# Patient Record
Sex: FEMALE | Race: WHITE | Employment: FULL TIME | URBAN - METROPOLITAN AREA
[De-identification: names, ages, dates, MRNs, and addresses within clinical notes are randomized per-mention and may not be internally consistent; named-entity substitution may affect disease eponyms.]

---

## 2018-03-02 ENCOUNTER — HOSPITAL ENCOUNTER (OUTPATIENT)
Dept: MAMMOGRAPHY | Age: 53
Discharge: HOME | End: 2018-03-02

## 2018-03-02 DIAGNOSIS — Z12.31 VISIT FOR SCREENING MAMMOGRAM: ICD-10-CM

## 2018-03-05 ENCOUNTER — OFFICE VISIT (OUTPATIENT)
Dept: FAMILY MEDICINE CLINIC | Age: 53
End: 2018-03-05

## 2018-03-05 VITALS
HEIGHT: 66 IN | RESPIRATION RATE: 18 BRPM | WEIGHT: 160.2 LBS | HEART RATE: 72 BPM | BODY MASS INDEX: 25.75 KG/M2 | OXYGEN SATURATION: 98 % | DIASTOLIC BLOOD PRESSURE: 80 MMHG | TEMPERATURE: 98.2 F | SYSTOLIC BLOOD PRESSURE: 110 MMHG

## 2018-03-05 DIAGNOSIS — N95.1 HOT FLASHES DUE TO MENOPAUSE: ICD-10-CM

## 2018-03-05 DIAGNOSIS — G89.29 CHRONIC LEFT SHOULDER PAIN: ICD-10-CM

## 2018-03-05 DIAGNOSIS — M25.512 CHRONIC LEFT SHOULDER PAIN: ICD-10-CM

## 2018-03-05 DIAGNOSIS — F41.9 ANXIETY: ICD-10-CM

## 2018-03-05 DIAGNOSIS — Z96.652 HISTORY OF LEFT KNEE REPLACEMENT: ICD-10-CM

## 2018-03-05 DIAGNOSIS — Z23 IMMUNIZATION DUE: ICD-10-CM

## 2018-03-05 DIAGNOSIS — Z00.00 PHYSICAL EXAM: Primary | ICD-10-CM

## 2018-03-05 DIAGNOSIS — Z13.29 SCREENING FOR THYROID DISORDER: ICD-10-CM

## 2018-03-05 PROCEDURE — 99386 PREV VISIT NEW AGE 40-64: CPT | Performed by: NURSE PRACTITIONER

## 2018-03-05 PROCEDURE — 36415 COLL VENOUS BLD VENIPUNCTURE: CPT | Performed by: NURSE PRACTITIONER

## 2018-03-05 RX ORDER — SERTRALINE HYDROCHLORIDE 25 MG/1
25 TABLET, FILM COATED ORAL DAILY
Qty: 30 TABLET | Refills: 1 | Status: SHIPPED | OUTPATIENT
Start: 2018-03-05 | End: 2018-04-04 | Stop reason: SDUPTHER

## 2018-04-05 RX ORDER — SERTRALINE HYDROCHLORIDE 25 MG/1
TABLET, FILM COATED ORAL
Qty: 90 TABLET | Refills: 1 | Status: SHIPPED | OUTPATIENT
Start: 2018-04-05 | End: 2018-04-24

## 2018-04-24 ENCOUNTER — OFFICE VISIT (OUTPATIENT)
Dept: FAMILY MEDICINE CLINIC | Age: 53
End: 2018-04-24

## 2018-04-24 VITALS
WEIGHT: 166 LBS | TEMPERATURE: 98.5 F | HEART RATE: 79 BPM | BODY MASS INDEX: 26.79 KG/M2 | RESPIRATION RATE: 22 BRPM | OXYGEN SATURATION: 97 % | SYSTOLIC BLOOD PRESSURE: 120 MMHG | DIASTOLIC BLOOD PRESSURE: 78 MMHG

## 2018-04-24 DIAGNOSIS — Z13.29 SCREENING FOR THYROID DISORDER: ICD-10-CM

## 2018-04-24 DIAGNOSIS — M79.674 PAIN IN RIGHT TOE(S): ICD-10-CM

## 2018-04-24 DIAGNOSIS — F41.9 ANXIETY: Primary | ICD-10-CM

## 2018-04-24 DIAGNOSIS — Z01.818 PRE-OP EVALUATION: ICD-10-CM

## 2018-04-24 PROCEDURE — 99213 OFFICE O/P EST LOW 20 MIN: CPT | Performed by: NURSE PRACTITIONER

## 2018-04-24 ASSESSMENT — PATIENT HEALTH QUESTIONNAIRE - PHQ9
SUM OF ALL RESPONSES TO PHQ QUESTIONS 1-9: 0
SUM OF ALL RESPONSES TO PHQ9 QUESTIONS 1 & 2: 0
2. FEELING DOWN, DEPRESSED OR HOPELESS: 0
1. LITTLE INTEREST OR PLEASURE IN DOING THINGS: 0

## 2018-04-25 ENCOUNTER — TELEPHONE (OUTPATIENT)
Dept: FAMILY MEDICINE CLINIC | Age: 53
End: 2018-04-25

## 2018-04-25 DIAGNOSIS — M25.512 LEFT SHOULDER PAIN, UNSPECIFIED CHRONICITY: ICD-10-CM

## 2018-04-25 DIAGNOSIS — M54.2 NECK PAIN: Primary | ICD-10-CM

## 2018-04-25 DIAGNOSIS — M25.562 LEFT KNEE PAIN, UNSPECIFIED CHRONICITY: ICD-10-CM

## 2018-05-01 ENCOUNTER — OFFICE VISIT (OUTPATIENT)
Dept: ORTHOPEDIC SURGERY | Age: 53
End: 2018-05-01

## 2018-05-01 ENCOUNTER — TELEPHONE (OUTPATIENT)
Dept: FAMILY MEDICINE CLINIC | Age: 53
End: 2018-05-01

## 2018-05-01 ENCOUNTER — TELEPHONE (OUTPATIENT)
Dept: ORTHOPEDIC SURGERY | Age: 53
End: 2018-05-01

## 2018-05-01 VITALS
SYSTOLIC BLOOD PRESSURE: 124 MMHG | BODY MASS INDEX: 26.68 KG/M2 | WEIGHT: 166.01 LBS | HEART RATE: 98 BPM | DIASTOLIC BLOOD PRESSURE: 70 MMHG | HEIGHT: 66 IN

## 2018-05-01 DIAGNOSIS — S92.502A CLOSED FRACTURE OF PHALANX OF LEFT FOURTH TOE, INITIAL ENCOUNTER: ICD-10-CM

## 2018-05-01 DIAGNOSIS — Z01.818 PRE-OP EVALUATION: Primary | ICD-10-CM

## 2018-05-01 DIAGNOSIS — M79.671 FOOT PAIN, RIGHT: Primary | ICD-10-CM

## 2018-05-01 PROCEDURE — 99203 OFFICE O/P NEW LOW 30 MIN: CPT | Performed by: PODIATRIST

## 2018-05-02 ENCOUNTER — EVALUATION (OUTPATIENT)
Dept: PHYSICAL THERAPY | Age: 53
End: 2018-05-02

## 2018-05-02 DIAGNOSIS — M54.2 NECK PAIN: Primary | ICD-10-CM

## 2018-05-04 ENCOUNTER — HOSPITAL ENCOUNTER (OUTPATIENT)
Dept: SURGERY | Age: 53
Discharge: HOME | End: 2018-05-04
Attending: PODIATRIST | Admitting: PODIATRIST

## 2018-05-04 VITALS
TEMPERATURE: 98.2 F | RESPIRATION RATE: 20 BRPM | SYSTOLIC BLOOD PRESSURE: 116 MMHG | DIASTOLIC BLOOD PRESSURE: 72 MMHG | HEART RATE: 76 BPM | HEIGHT: 66 IN | BODY MASS INDEX: 25.71 KG/M2 | WEIGHT: 160 LBS | OXYGEN SATURATION: 98 %

## 2018-05-04 DIAGNOSIS — S92.501S: Primary | ICD-10-CM

## 2018-05-04 RX ORDER — OXYCODONE HYDROCHLORIDE AND ACETAMINOPHEN 5; 325 MG/1; MG/1
1 TABLET ORAL PRN
Status: ACTIVE | OUTPATIENT
Start: 2018-05-04 | End: 2018-05-04

## 2018-05-04 RX ORDER — OXYCODONE HYDROCHLORIDE AND ACETAMINOPHEN 5; 325 MG/1; MG/1
2 TABLET ORAL PRN
Status: ACTIVE | OUTPATIENT
Start: 2018-05-04 | End: 2018-05-04

## 2018-05-04 RX ORDER — MORPHINE SULFATE 2 MG/ML
1 INJECTION, SOLUTION INTRAMUSCULAR; INTRAVENOUS EVERY 5 MIN PRN
Status: DISCONTINUED | OUTPATIENT
Start: 2018-05-04 | End: 2018-05-05 | Stop reason: HOSPADM

## 2018-05-04 RX ORDER — PROMETHAZINE HYDROCHLORIDE 25 MG/1
25 TABLET ORAL EVERY 6 HOURS PRN
Qty: 30 TABLET | Refills: 0 | Status: SHIPPED | OUTPATIENT
Start: 2018-05-04 | End: 2018-05-11

## 2018-05-04 RX ORDER — SODIUM CHLORIDE 0.9 % (FLUSH) 0.9 %
10 SYRINGE (ML) INJECTION EVERY 12 HOURS SCHEDULED
Status: DISCONTINUED | OUTPATIENT
Start: 2018-05-04 | End: 2018-05-05 | Stop reason: HOSPADM

## 2018-05-04 RX ORDER — LABETALOL HYDROCHLORIDE 5 MG/ML
5 INJECTION, SOLUTION INTRAVENOUS EVERY 10 MIN PRN
Status: DISCONTINUED | OUTPATIENT
Start: 2018-05-04 | End: 2018-05-05 | Stop reason: HOSPADM

## 2018-05-04 RX ORDER — PROMETHAZINE HYDROCHLORIDE 25 MG/ML
6.25 INJECTION, SOLUTION INTRAMUSCULAR; INTRAVENOUS
Status: ACTIVE | OUTPATIENT
Start: 2018-05-04 | End: 2018-05-04

## 2018-05-04 RX ORDER — ONDANSETRON 2 MG/ML
4 INJECTION INTRAMUSCULAR; INTRAVENOUS
Status: ACTIVE | OUTPATIENT
Start: 2018-05-04 | End: 2018-05-04

## 2018-05-04 RX ORDER — SODIUM CHLORIDE 0.9 % (FLUSH) 0.9 %
10 SYRINGE (ML) INJECTION PRN
Status: DISCONTINUED | OUTPATIENT
Start: 2018-05-04 | End: 2018-05-05 | Stop reason: HOSPADM

## 2018-05-04 RX ORDER — MEPERIDINE HYDROCHLORIDE 50 MG/ML
12.5 INJECTION INTRAMUSCULAR; INTRAVENOUS; SUBCUTANEOUS EVERY 5 MIN PRN
Status: DISCONTINUED | OUTPATIENT
Start: 2018-05-04 | End: 2018-05-05 | Stop reason: HOSPADM

## 2018-05-04 RX ORDER — DIPHENHYDRAMINE HYDROCHLORIDE 50 MG/ML
12.5 INJECTION INTRAMUSCULAR; INTRAVENOUS
Status: ACTIVE | OUTPATIENT
Start: 2018-05-04 | End: 2018-05-04

## 2018-05-04 RX ORDER — HYDRALAZINE HYDROCHLORIDE 20 MG/ML
5 INJECTION INTRAMUSCULAR; INTRAVENOUS EVERY 10 MIN PRN
Status: DISCONTINUED | OUTPATIENT
Start: 2018-05-04 | End: 2018-05-05 | Stop reason: HOSPADM

## 2018-05-04 RX ORDER — LIDOCAINE HYDROCHLORIDE 10 MG/ML
1 INJECTION, SOLUTION EPIDURAL; INFILTRATION; INTRACAUDAL; PERINEURAL
Status: ACTIVE | OUTPATIENT
Start: 2018-05-04 | End: 2018-05-04

## 2018-05-04 RX ORDER — SODIUM CHLORIDE, SODIUM LACTATE, POTASSIUM CHLORIDE, CALCIUM CHLORIDE 600; 310; 30; 20 MG/100ML; MG/100ML; MG/100ML; MG/100ML
INJECTION, SOLUTION INTRAVENOUS CONTINUOUS
Status: DISCONTINUED | OUTPATIENT
Start: 2018-05-04 | End: 2018-05-05 | Stop reason: HOSPADM

## 2018-05-04 RX ORDER — TRAMADOL HYDROCHLORIDE 50 MG/1
50 TABLET ORAL EVERY 6 HOURS PRN
Qty: 20 TABLET | Refills: 0 | Status: SHIPPED | OUTPATIENT
Start: 2018-05-04 | End: 2018-05-08

## 2018-05-04 RX ORDER — MORPHINE SULFATE 2 MG/ML
2 INJECTION, SOLUTION INTRAMUSCULAR; INTRAVENOUS EVERY 5 MIN PRN
Status: DISCONTINUED | OUTPATIENT
Start: 2018-05-04 | End: 2018-05-05 | Stop reason: HOSPADM

## 2018-05-04 RX ADMIN — SODIUM CHLORIDE, SODIUM LACTATE, POTASSIUM CHLORIDE, CALCIUM CHLORIDE: 600; 310; 30; 20 INJECTION, SOLUTION INTRAVENOUS at 06:50

## 2018-05-04 ASSESSMENT — PAIN - FUNCTIONAL ASSESSMENT: PAIN_FUNCTIONAL_ASSESSMENT: 0-10

## 2018-05-04 ASSESSMENT — PAIN DESCRIPTION - DESCRIPTORS: DESCRIPTORS: CONSTANT;ACHING;NUMBNESS

## 2018-05-04 ASSESSMENT — PAIN SCALES - GENERAL
PAINLEVEL_OUTOF10: 0

## 2018-05-08 ENCOUNTER — OFFICE VISIT (OUTPATIENT)
Dept: ORTHOPEDIC SURGERY | Age: 53
End: 2018-05-08

## 2018-05-08 VITALS
HEIGHT: 66 IN | DIASTOLIC BLOOD PRESSURE: 78 MMHG | BODY MASS INDEX: 25.72 KG/M2 | HEART RATE: 86 BPM | WEIGHT: 160.05 LBS | SYSTOLIC BLOOD PRESSURE: 128 MMHG

## 2018-05-08 DIAGNOSIS — Z09 POSTOP CHECK: ICD-10-CM

## 2018-05-08 DIAGNOSIS — S92.502S: Primary | ICD-10-CM

## 2018-05-08 PROCEDURE — 99024 POSTOP FOLLOW-UP VISIT: CPT | Performed by: PODIATRIST

## 2018-05-09 ENCOUNTER — EVALUATION (OUTPATIENT)
Dept: PHYSICAL THERAPY | Age: 53
End: 2018-05-09

## 2018-05-09 DIAGNOSIS — R53.1 DECREASED STRENGTH: ICD-10-CM

## 2018-05-09 DIAGNOSIS — M54.2 NECK PAIN: Primary | ICD-10-CM

## 2018-05-09 DIAGNOSIS — M25.60 DECREASED RANGE OF MOTION: ICD-10-CM

## 2018-05-09 PROCEDURE — 97110 THERAPEUTIC EXERCISES: CPT | Performed by: PHYSICAL THERAPIST

## 2018-05-09 PROCEDURE — 97161 PT EVAL LOW COMPLEX 20 MIN: CPT | Performed by: PHYSICAL THERAPIST

## 2018-05-09 PROCEDURE — 97530 THERAPEUTIC ACTIVITIES: CPT | Performed by: PHYSICAL THERAPIST

## 2018-05-14 ENCOUNTER — TREATMENT (OUTPATIENT)
Dept: PHYSICAL THERAPY | Age: 53
End: 2018-05-14

## 2018-05-14 DIAGNOSIS — M25.60 DECREASED RANGE OF MOTION: ICD-10-CM

## 2018-05-14 DIAGNOSIS — R53.1 DECREASED STRENGTH: ICD-10-CM

## 2018-05-14 DIAGNOSIS — M54.2 NECK PAIN: Primary | ICD-10-CM

## 2018-05-14 PROCEDURE — 97140 MANUAL THERAPY 1/> REGIONS: CPT | Performed by: PHYSICAL THERAPIST

## 2018-05-14 PROCEDURE — 97530 THERAPEUTIC ACTIVITIES: CPT | Performed by: PHYSICAL THERAPIST

## 2018-05-14 PROCEDURE — 97110 THERAPEUTIC EXERCISES: CPT | Performed by: PHYSICAL THERAPIST

## 2018-05-16 ENCOUNTER — TREATMENT (OUTPATIENT)
Dept: PHYSICAL THERAPY | Age: 53
End: 2018-05-16

## 2018-05-16 ENCOUNTER — OFFICE VISIT (OUTPATIENT)
Dept: ORTHOPEDIC SURGERY | Age: 53
End: 2018-05-16

## 2018-05-16 VITALS
WEIGHT: 160.05 LBS | BODY MASS INDEX: 25.72 KG/M2 | HEIGHT: 66 IN | HEART RATE: 64 BPM | SYSTOLIC BLOOD PRESSURE: 119 MMHG | DIASTOLIC BLOOD PRESSURE: 73 MMHG

## 2018-05-16 DIAGNOSIS — Z09 POSTOP CHECK: Primary | ICD-10-CM

## 2018-05-16 DIAGNOSIS — M25.60 DECREASED RANGE OF MOTION: ICD-10-CM

## 2018-05-16 DIAGNOSIS — S92.502S: ICD-10-CM

## 2018-05-16 DIAGNOSIS — R53.1 DECREASED STRENGTH: ICD-10-CM

## 2018-05-16 DIAGNOSIS — M54.2 NECK PAIN: Primary | ICD-10-CM

## 2018-05-16 PROCEDURE — 97110 THERAPEUTIC EXERCISES: CPT | Performed by: PHYSICAL THERAPIST

## 2018-05-16 PROCEDURE — 97140 MANUAL THERAPY 1/> REGIONS: CPT | Performed by: PHYSICAL THERAPIST

## 2018-05-16 PROCEDURE — 97530 THERAPEUTIC ACTIVITIES: CPT | Performed by: PHYSICAL THERAPIST

## 2018-05-16 PROCEDURE — 99024 POSTOP FOLLOW-UP VISIT: CPT | Performed by: PODIATRIST

## 2018-05-21 ENCOUNTER — TREATMENT (OUTPATIENT)
Dept: PHYSICAL THERAPY | Age: 53
End: 2018-05-21

## 2018-05-21 DIAGNOSIS — M54.2 NECK PAIN: Primary | ICD-10-CM

## 2018-05-21 DIAGNOSIS — M25.60 DECREASED RANGE OF MOTION: ICD-10-CM

## 2018-05-21 DIAGNOSIS — R53.1 DECREASED STRENGTH: ICD-10-CM

## 2018-05-21 LAB
A/G RATIO: 1.8 (ref 1.1–2.2)
ALBUMIN SERPL-MCNC: 4.5 G/DL (ref 3.4–5)
ALP BLD-CCNC: 54 U/L (ref 40–129)
ALT SERPL-CCNC: 65 U/L (ref 10–40)
ANION GAP SERPL CALCULATED.3IONS-SCNC: 14 MMOL/L (ref 3–16)
AST SERPL-CCNC: 63 U/L (ref 15–37)
BASOPHILS ABSOLUTE: 0 K/UL (ref 0–0.2)
BASOPHILS RELATIVE PERCENT: 1.2 %
BILIRUB SERPL-MCNC: 0.3 MG/DL (ref 0–1)
BUN BLDV-MCNC: 13 MG/DL (ref 7–20)
CALCIUM SERPL-MCNC: 8.8 MG/DL (ref 8.3–10.6)
CHLORIDE BLD-SCNC: 101 MMOL/L (ref 99–110)
CHOLESTEROL, TOTAL: 213 MG/DL (ref 0–199)
CO2: 27 MMOL/L (ref 21–32)
CREAT SERPL-MCNC: <0.5 MG/DL (ref 0.6–1.1)
EOSINOPHILS ABSOLUTE: 0.2 K/UL (ref 0–0.6)
EOSINOPHILS RELATIVE PERCENT: 4.9 %
GFR AFRICAN AMERICAN: >60
GFR NON-AFRICAN AMERICAN: >60
GLOBULIN: 2.5 G/DL
GLUCOSE BLD-MCNC: 85 MG/DL (ref 70–99)
HCT VFR BLD CALC: 40.2 % (ref 36–48)
HDLC SERPL-MCNC: 86 MG/DL (ref 40–60)
HEMOGLOBIN: 14 G/DL (ref 12–16)
LDL CHOLESTEROL CALCULATED: 96 MG/DL
LYMPHOCYTES ABSOLUTE: 1.7 K/UL (ref 1–5.1)
LYMPHOCYTES RELATIVE PERCENT: 45.5 %
MCH RBC QN AUTO: 34.2 PG (ref 26–34)
MCHC RBC AUTO-ENTMCNC: 34.9 G/DL (ref 31–36)
MCV RBC AUTO: 98.1 FL (ref 80–100)
MONOCYTES ABSOLUTE: 0.3 K/UL (ref 0–1.3)
MONOCYTES RELATIVE PERCENT: 7.9 %
NEUTROPHILS ABSOLUTE: 1.5 K/UL (ref 1.7–7.7)
NEUTROPHILS RELATIVE PERCENT: 40.5 %
PDW BLD-RTO: 13.2 % (ref 12.4–15.4)
PLATELET # BLD: 189 K/UL (ref 135–450)
PMV BLD AUTO: 8.7 FL (ref 5–10.5)
POTASSIUM SERPL-SCNC: 4.4 MMOL/L (ref 3.5–5.1)
RBC # BLD: 4.1 M/UL (ref 4–5.2)
SODIUM BLD-SCNC: 142 MMOL/L (ref 136–145)
TOTAL PROTEIN: 7 G/DL (ref 6.4–8.2)
TRIGL SERPL-MCNC: 154 MG/DL (ref 0–150)
TSH REFLEX: 3.8 UIU/ML (ref 0.27–4.2)
VLDLC SERPL CALC-MCNC: 31 MG/DL
WBC # BLD: 3.8 K/UL (ref 4–11)

## 2018-05-21 PROCEDURE — 97530 THERAPEUTIC ACTIVITIES: CPT | Performed by: PHYSICAL THERAPIST

## 2018-05-21 PROCEDURE — 97110 THERAPEUTIC EXERCISES: CPT | Performed by: PHYSICAL THERAPIST

## 2018-05-21 PROCEDURE — 97140 MANUAL THERAPY 1/> REGIONS: CPT | Performed by: PHYSICAL THERAPIST

## 2018-05-22 LAB
ESTIMATED AVERAGE GLUCOSE: 114 MG/DL
HBA1C MFR BLD: 5.6 %

## 2018-05-23 ENCOUNTER — TREATMENT (OUTPATIENT)
Dept: PHYSICAL THERAPY | Age: 53
End: 2018-05-23

## 2018-05-23 DIAGNOSIS — M54.2 NECK PAIN: Primary | ICD-10-CM

## 2018-05-23 DIAGNOSIS — R53.1 DECREASED STRENGTH: ICD-10-CM

## 2018-05-23 DIAGNOSIS — M25.60 DECREASED RANGE OF MOTION: ICD-10-CM

## 2018-05-23 PROCEDURE — 97530 THERAPEUTIC ACTIVITIES: CPT | Performed by: PHYSICAL THERAPIST

## 2018-05-23 PROCEDURE — 97110 THERAPEUTIC EXERCISES: CPT | Performed by: PHYSICAL THERAPIST

## 2018-05-23 PROCEDURE — 97140 MANUAL THERAPY 1/> REGIONS: CPT | Performed by: PHYSICAL THERAPIST

## 2018-05-31 ENCOUNTER — OFFICE VISIT (OUTPATIENT)
Dept: ORTHOPEDIC SURGERY | Age: 53
End: 2018-05-31

## 2018-05-31 VITALS
WEIGHT: 160.05 LBS | SYSTOLIC BLOOD PRESSURE: 117 MMHG | HEIGHT: 66 IN | HEART RATE: 75 BPM | DIASTOLIC BLOOD PRESSURE: 79 MMHG | BODY MASS INDEX: 25.72 KG/M2

## 2018-05-31 DIAGNOSIS — Z09 POSTOP CHECK: ICD-10-CM

## 2018-05-31 DIAGNOSIS — S92.502S: Primary | ICD-10-CM

## 2018-05-31 PROCEDURE — 99024 POSTOP FOLLOW-UP VISIT: CPT | Performed by: PODIATRIST

## 2018-06-01 ENCOUNTER — TREATMENT (OUTPATIENT)
Dept: PHYSICAL THERAPY | Age: 53
End: 2018-06-01

## 2018-06-01 DIAGNOSIS — M25.60 DECREASED RANGE OF MOTION: ICD-10-CM

## 2018-06-01 DIAGNOSIS — M54.2 NECK PAIN: Primary | ICD-10-CM

## 2018-06-01 DIAGNOSIS — R53.1 DECREASED STRENGTH: ICD-10-CM

## 2018-06-01 PROCEDURE — 97110 THERAPEUTIC EXERCISES: CPT | Performed by: PHYSICAL THERAPIST

## 2018-06-01 PROCEDURE — 97140 MANUAL THERAPY 1/> REGIONS: CPT | Performed by: PHYSICAL THERAPIST

## 2018-06-01 PROCEDURE — 97530 THERAPEUTIC ACTIVITIES: CPT | Performed by: PHYSICAL THERAPIST

## 2018-06-01 PROCEDURE — 97112 NEUROMUSCULAR REEDUCATION: CPT | Performed by: PHYSICAL THERAPIST

## 2018-06-05 ENCOUNTER — TELEPHONE (OUTPATIENT)
Dept: ORTHOPEDIC SURGERY | Age: 53
End: 2018-06-05

## 2018-06-07 PROBLEM — Z09 POSTOP CHECK: Status: RESOLVED | Noted: 2018-05-08 | Resolved: 2018-06-07

## 2018-06-18 ENCOUNTER — OFFICE VISIT (OUTPATIENT)
Dept: GYNECOLOGY | Age: 53
End: 2018-06-18

## 2018-06-18 VITALS
DIASTOLIC BLOOD PRESSURE: 75 MMHG | HEART RATE: 65 BPM | BODY MASS INDEX: 27.42 KG/M2 | SYSTOLIC BLOOD PRESSURE: 110 MMHG | TEMPERATURE: 97.2 F | HEIGHT: 65 IN | RESPIRATION RATE: 17 BRPM | WEIGHT: 164.6 LBS

## 2018-06-18 DIAGNOSIS — Z01.419 WELL WOMAN EXAM WITH ROUTINE GYNECOLOGICAL EXAM: Primary | ICD-10-CM

## 2018-06-18 DIAGNOSIS — S99.921S INJURY OF TOE ON RIGHT FOOT, SEQUELA: ICD-10-CM

## 2018-06-18 DIAGNOSIS — N39.41 URGE INCONTINENCE: ICD-10-CM

## 2018-06-18 PROCEDURE — 99386 PREV VISIT NEW AGE 40-64: CPT | Performed by: OBSTETRICS & GYNECOLOGY

## 2018-06-20 ENCOUNTER — TREATMENT (OUTPATIENT)
Dept: PHYSICAL THERAPY | Age: 53
End: 2018-06-20

## 2018-06-20 DIAGNOSIS — R53.1 DECREASED STRENGTH: ICD-10-CM

## 2018-06-20 DIAGNOSIS — M25.60 DECREASED RANGE OF MOTION: ICD-10-CM

## 2018-06-20 DIAGNOSIS — M54.2 NECK PAIN: Primary | ICD-10-CM

## 2018-06-20 LAB
HPV COMMENT: NORMAL
HPV TYPE 16: NOT DETECTED
HPV TYPE 18: NOT DETECTED
HPVOH (OTHER TYPES): NOT DETECTED

## 2018-06-20 ASSESSMENT — ENCOUNTER SYMPTOMS
GASTROINTESTINAL NEGATIVE: 1
EYES NEGATIVE: 1
RESPIRATORY NEGATIVE: 1

## 2018-06-26 ENCOUNTER — OFFICE VISIT (OUTPATIENT)
Dept: ORTHOPEDIC SURGERY | Age: 53
End: 2018-06-26

## 2018-06-26 VITALS
DIASTOLIC BLOOD PRESSURE: 78 MMHG | WEIGHT: 160 LBS | SYSTOLIC BLOOD PRESSURE: 132 MMHG | BODY MASS INDEX: 26.66 KG/M2 | HEART RATE: 88 BPM | HEIGHT: 65 IN

## 2018-06-26 DIAGNOSIS — S92.502S: Primary | ICD-10-CM

## 2018-06-26 PROCEDURE — 99024 POSTOP FOLLOW-UP VISIT: CPT | Performed by: PODIATRIST

## 2018-06-26 RX ORDER — PREDNISONE 10 MG/1
TABLET ORAL
Qty: 26 TABLET | Refills: 0 | Status: SHIPPED | OUTPATIENT
Start: 2018-06-26 | End: 2018-08-27

## 2018-07-01 ENCOUNTER — HOSPITAL ENCOUNTER (OUTPATIENT)
Dept: PHYSICAL THERAPY | Age: 53
Discharge: HOME | End: 2018-07-01
Attending: ORTHOPAEDIC SURGERY | Admitting: ORTHOPAEDIC SURGERY

## 2018-07-10 ENCOUNTER — OFFICE VISIT (OUTPATIENT)
Dept: ORTHOPEDIC SURGERY | Age: 53
End: 2018-07-10

## 2018-07-10 VITALS
BODY MASS INDEX: 24.25 KG/M2 | HEIGHT: 68 IN | HEART RATE: 87 BPM | DIASTOLIC BLOOD PRESSURE: 78 MMHG | WEIGHT: 160 LBS | SYSTOLIC BLOOD PRESSURE: 132 MMHG

## 2018-07-10 DIAGNOSIS — S92.502S: ICD-10-CM

## 2018-07-10 DIAGNOSIS — M79.671 RIGHT FOOT PAIN: Primary | ICD-10-CM

## 2018-07-10 PROCEDURE — 99024 POSTOP FOLLOW-UP VISIT: CPT | Performed by: PODIATRIST

## 2018-07-10 NOTE — PROGRESS NOTES
10 week postop check. She states that she does not feel any better. Most of her pain is at the ball of the foot. She has palpable tenderness at the plantar aspect of the right 4th MTP. No signs of infection are present. She continues having moderate edema to the toe. Status post 4th digit PIPJ arthroplasty right foot ×10 weeks    I think her activity level probably remains too high. She presents today and regular shoe and she has been removing the postop shoe at times. I think she needs to wear that full-time still and continue with the Coban on the toe. We'll consider a cortisone injection for the MTP if she continues having pain. She states that the original pain in the toe has resolved but she is having pain all around the toe now.

## 2018-08-03 ENCOUNTER — OFFICE VISIT (OUTPATIENT)
Dept: ORTHOPEDIC SURGERY | Age: 53
End: 2018-08-03

## 2018-08-03 VITALS — BODY MASS INDEX: 25.83 KG/M2 | WEIGHT: 155 LBS | HEIGHT: 65 IN

## 2018-08-03 DIAGNOSIS — S92.502A CLOSED FRACTURE OF PHALANX OF LEFT FOURTH TOE, INITIAL ENCOUNTER: Primary | ICD-10-CM

## 2018-08-03 PROCEDURE — 99203 OFFICE O/P NEW LOW 30 MIN: CPT | Performed by: ORTHOPAEDIC SURGERY

## 2018-08-03 RX ORDER — MELOXICAM 7.5 MG/1
7.5 TABLET ORAL DAILY
Qty: 30 TABLET | Refills: 0 | Status: SHIPPED | OUTPATIENT
Start: 2018-08-03 | End: 2018-09-05

## 2018-08-04 NOTE — PROGRESS NOTES
CHIEF COMPLAINT:   1- Right foot pain/ 4th toe proximal phalanx head markedly displaced comminuted fracture, Feb 2018. 2- Right foot 4th toe PIP excision arthroplasty, Dr Robina Damon 5/4/2018. HISTORY:  Ms. Mukund Lowe 52 y.o.   female  presents today for the first visit for evaluation of a right foot continues pain after injury which occurred when she stubed her foot during her honeymoon in Feb 2018. She did not seek medical attention until she saw Dr Robina Damon 5/1/2018. She then underwent excision arthroplasty right 4th toe PIP joint. She is still complaining of right forefoot foot and 4th toe pain and swelling. This is better with elevation and worse with bearing any wt. The pain is sharp and radiating up her foot and leg. No other complaint. No numbness or tingling sensation. Past Medical History:   Diagnosis Date    Chronic neck pain     Closed fracture of right foot 05/01/2018    PONV (postoperative nausea and vomiting)     Urinary incontinence        Past Surgical History:   Procedure Laterality Date    CATARACT REMOVAL Bilateral     CHOLECYSTECTOMY      FOOT SURGERY Right 05/04/2018    rt 4th digit arthroplasty of rt foot    JOINT REPLACEMENT Left     knee    LUNG SURGERY Left     lower lobe    SPINE SURGERY         Social History     Social History    Marital status:      Spouse name: N/A    Number of children: N/A    Years of education: N/A     Occupational History    Not on file.      Social History Main Topics    Smoking status: Never Smoker    Smokeless tobacco: Never Used    Alcohol use 0.6 - 1.2 oz/week     1 - 2 Glasses of wine per week    Drug use: No    Sexual activity: Yes     Birth control/ protection: Post-menopausal     Other Topics Concern    Not on file     Social History Narrative    No narrative on file       Family History   Problem Relation Age of Onset    Cancer Father         kidney    Stroke Maternal Grandmother     Heart Attack Maternal Grandfather    

## 2018-08-27 ENCOUNTER — OFFICE VISIT (OUTPATIENT)
Dept: FAMILY MEDICINE CLINIC | Age: 53
End: 2018-08-27

## 2018-08-27 VITALS
DIASTOLIC BLOOD PRESSURE: 80 MMHG | WEIGHT: 170.2 LBS | HEART RATE: 71 BPM | HEIGHT: 65 IN | TEMPERATURE: 98.6 F | BODY MASS INDEX: 28.36 KG/M2 | SYSTOLIC BLOOD PRESSURE: 110 MMHG | OXYGEN SATURATION: 97 %

## 2018-08-27 DIAGNOSIS — N39.45 CONTINUOUS LEAKAGE OF URINE: ICD-10-CM

## 2018-08-27 DIAGNOSIS — Z01.818 PRE-OP EXAMINATION: Primary | ICD-10-CM

## 2018-08-27 PROCEDURE — 99214 OFFICE O/P EST MOD 30 MIN: CPT | Performed by: FAMILY MEDICINE

## 2018-08-27 ASSESSMENT — ENCOUNTER SYMPTOMS
BLOOD IN STOOL: 0
NAUSEA: 0
SHORTNESS OF BREATH: 0
DIARRHEA: 0
CONSTIPATION: 0
VOMITING: 0
ABDOMINAL PAIN: 0
BLURRED VISION: 0

## 2018-08-27 NOTE — PATIENT INSTRUCTIONS
Okay for your procedure   By Adan Cushing, MD, who plans on performing INTERSTIM STAGE I WITH C-ARM on September 6, 2018 at [facility].    Let me know if any questions. We will fax over the information to her/the hospital.    Nice to meet you today, and I hope the interstim is successful for you. Peri Balderas.  8/27/2018    Patient Education        Learning About Your Urinary System  What does your urinary system do? The urinary system is the network of organs and tubes that process and carry urine out of the body. The kidneys and ureters are called the upper urinary tract. The bladder and urethra are called the lower urinary tract. Your kidneys filter waste products and water from the blood to make urine. They also keep the proper balance of fluids and chemicals your body needs to work as it should. Tubes called ureters carry urine from the kidneys to the bladder. The bladder stores the urine. When the bladder is full, the urine leaves the body through another thin tube, called the urethra. What problems can happen with this system? Urinary problems include:  · A urinary tract infection, or UTI. This is an infection anywhere between the kidneys and the urethra. · Kidney stones. These are tiny stones that form in the kidneys. They can cause blood in the urine and severe pain. · Trouble urinating or not being able to urinate. This is often caused by an enlarged prostate in men. · Incontinence. This means you have trouble controlling the release of urine. Common symptoms of a urinary problem include:  · A burning feeling when you urinate. This is the most common symptom of a UTI. · An urgent need to urinate. · Blood in the urine. Your urine may look red, brown, or pink. · Incontinence. Treatment for urinary problems depends on the cause. Your doctor may treat an infection with an antibiotic. Or you may need a procedure to help a kidney stone pass.   How can you

## 2018-08-27 NOTE — PROGRESS NOTES
toe of left foot    Continuous leakage of urine       Past Medical History:   Diagnosis Date    Chronic neck pain     Closed fracture of right foot 05/01/2018    PONV (postoperative nausea and vomiting)     Urinary incontinence      Past Surgical History:   Procedure Laterality Date    CATARACT REMOVAL Bilateral     CHOLECYSTECTOMY      FOOT SURGERY Right 05/04/2018    rt 4th digit arthroplasty of rt foot    JOINT REPLACEMENT Left     knee    LUNG SURGERY Left     lower lobe    SPINE SURGERY       Family History   Problem Relation Age of Onset    Cancer Father         kidney    Stroke Maternal Grandmother     Heart Attack Maternal Grandfather     Alcohol Abuse Paternal Grandfather      Social History     Social History    Marital status:      Spouse name: N/A    Number of children: N/A    Years of education: N/A     Occupational History    Not on file. Social History Main Topics    Smoking status: Never Smoker    Smokeless tobacco: Never Used    Alcohol use 0.6 - 1.2 oz/week     1 - 2 Glasses of wine per week    Drug use: No    Sexual activity: Yes     Birth control/ protection: Post-menopausal     Other Topics Concern    Not on file     Social History Narrative    No narrative on file       Review of Systems  Review of Systems   Constitutional: Negative for chills and fever. HENT: Negative for congestion and hearing loss. Eyes: Negative for blurred vision. Respiratory: Negative for shortness of breath. Cardiovascular: Negative for chest pain. Gastrointestinal: Negative for abdominal pain, blood in stool, constipation, diarrhea, nausea and vomiting. Genitourinary: Negative for dysuria and hematuria.        +continual urine leakage incontinence   Musculoskeletal: Positive for neck pain. Skin: Negative for rash. Neurological: Negative for headaches. Endo/Heme/Allergies: Does not bruise/bleed easily.    Psychiatric/Behavioral: Negative for depression and memory

## 2018-09-05 ENCOUNTER — PAT TELEPHONE (OUTPATIENT)
Dept: PREADMISSION TESTING | Age: 53
End: 2018-09-05

## 2018-09-05 VITALS — BODY MASS INDEX: 28.32 KG/M2 | HEIGHT: 65 IN | WEIGHT: 170 LBS

## 2018-09-05 NOTE — PRE-PROCEDURE INSTRUCTIONS
you.    For your comfort, please wear simple loose fitting clothing to the hospital.  Please do not bring valuables. Do not wear any make-up or nail polish on your fingers or toes      For your safety, please do not wear any jewelry or body piercing's on the day of surgery. All jewelry must be removed. If you have dentures, they will be removed before going to operating room. For your convenience, we will provide you with a container. If you wear contact lenses or glasses, they will be removed, please bring a case for them. If you have a living will and a durable power of  for healthcare, please bring in a copy. As part of our patient safety program to minimize surgical site infections, we ask you to do the following:    · Please notify your surgeon if you develop any illness between         now and the  day of your surgery. · This includes a cough, cold, fever, sore throat, nausea,         or vomiting, and diarrhea, etc.  ·  Please notify your surgeon if you experience dizziness, shortness         of breath or blurred vision between now and the time of your surgery. Do not shave your operative site 96 hours prior to surgery. For face and neck surgery, men may use an electric razor 48 hours   prior to surgery. You may shower the night before surgery or the morning of   your surgery with an antibacterial soap. You will need to bring a photo ID and insurance card    [facility] has an onsite pharmacy, would you like to utilize our pharmacy     If you will be staying overnight and use a C-pap machine, please bring   your C-pap to hospital     Our goal is to provide you with excellent care, therefore, visitors will be limited to two(2) in the room at a time so that we may focus on providing this care for you. Please contact pre-admission testing if you have any further questions.                  [facility] phone number:  [address] PAT fax number:

## 2018-09-06 ENCOUNTER — HOSPITAL ENCOUNTER (OUTPATIENT)
Dept: SURGERY | Age: 53
Discharge: HOME | End: 2018-09-06
Attending: UROLOGY | Admitting: UROLOGY

## 2018-09-06 VITALS
BODY MASS INDEX: 27.62 KG/M2 | HEART RATE: 50 BPM | WEIGHT: 165.79 LBS | HEIGHT: 65 IN | DIASTOLIC BLOOD PRESSURE: 64 MMHG | SYSTOLIC BLOOD PRESSURE: 122 MMHG | TEMPERATURE: 97.3 F | RESPIRATION RATE: 16 BRPM | OXYGEN SATURATION: 97 %

## 2018-09-06 DIAGNOSIS — N39.45 CONTINUOUS LEAKAGE OF URINE: ICD-10-CM

## 2018-09-06 RX ORDER — FENTANYL CITRATE 50 UG/ML
25 INJECTION, SOLUTION INTRAMUSCULAR; INTRAVENOUS EVERY 5 MIN PRN
Status: DISCONTINUED | OUTPATIENT
Start: 2018-09-06 | End: 2018-09-07 | Stop reason: HOSPADM

## 2018-09-06 RX ORDER — SODIUM CHLORIDE 0.9 % (FLUSH) 0.9 %
10 SYRINGE (ML) INJECTION EVERY 12 HOURS SCHEDULED
Status: DISCONTINUED | OUTPATIENT
Start: 2018-09-06 | End: 2018-09-07 | Stop reason: HOSPADM

## 2018-09-06 RX ORDER — ONDANSETRON 2 MG/ML
4 INJECTION INTRAMUSCULAR; INTRAVENOUS
Status: ACTIVE | OUTPATIENT
Start: 2018-09-06 | End: 2018-09-06

## 2018-09-06 RX ORDER — SODIUM CHLORIDE 0.9 % (FLUSH) 0.9 %
10 SYRINGE (ML) INJECTION PRN
Status: DISCONTINUED | OUTPATIENT
Start: 2018-09-06 | End: 2018-09-07 | Stop reason: HOSPADM

## 2018-09-06 RX ORDER — SODIUM CHLORIDE 9 MG/ML
INJECTION, SOLUTION INTRAVENOUS CONTINUOUS
Status: DISCONTINUED | OUTPATIENT
Start: 2018-09-06 | End: 2018-09-07 | Stop reason: HOSPADM

## 2018-09-06 RX ADMIN — SODIUM CHLORIDE: 9 INJECTION, SOLUTION INTRAVENOUS at 13:36

## 2018-09-06 ASSESSMENT — PAIN SCALES - GENERAL
PAINLEVEL_OUTOF10: 2
PAINLEVEL_OUTOF10: 2
PAINLEVEL_OUTOF10: 1
PAINLEVEL_OUTOF10: 2

## 2018-09-06 ASSESSMENT — ENCOUNTER SYMPTOMS: SHORTNESS OF BREATH: 0

## 2018-09-06 ASSESSMENT — PAIN DESCRIPTION - DESCRIPTORS: DESCRIPTORS: ACHING

## 2018-09-06 ASSESSMENT — PAIN - FUNCTIONAL ASSESSMENT: PAIN_FUNCTIONAL_ASSESSMENT: 0-10

## 2018-09-06 NOTE — H&P
Intravenous, Q5 Min PRN  ondansetron (ZOFRAN) injection 4 mg, 4 mg, Intravenous, Once PRN    Vitals:  /77   Pulse 68   Temp 97.6 °F (36.4 °C) (Temporal)   Resp 12   Ht 5' 5\" (1.651 m)   Wt 165 lb 12.6 oz (75.2 kg)   LMP  (LMP Unknown)   SpO2 100%   BMI 27.59 kg/m²     Intake/Output Summary (Last 24 hours) at 09/06/18 1525  Last data filed at 09/06/18 1521   Gross per 24 hour   Intake              700 ml   Output                0 ml   Net              700 ml       Review of Systems:  10 Systems were reviewed and negative except as in HPI      Physical Exam:  General Appearance: Alert and oriented, cooperative, no distress, appears stated age  Head: Normocephalic, without obvious abnormality, atraumatic  Back: no CVA tenderness  Lungs: respirations unlabored, no wheezing  Heart: Regular rate and rhythm, no lower extremity edema noted  Abdomen: Soft, non-tender, non-distended, no masses  Skin: Skin color, texture, turgor normal, no rashes or lesions  Neurologic: no gross deficits      Pelvic Exam Not Indicated- performed at office visit    Labs:  CBC Lab Results   Component Value Date    WBC 3.8 05/21/2018    RBC 4.10 05/21/2018    HGB 14.0 05/21/2018    HCT 40.2 05/21/2018    MCV 98.1 05/21/2018    MCH 34.2 05/21/2018    MCHC 34.9 05/21/2018    RDW 13.2 05/21/2018     05/21/2018    MPV 8.7 05/21/2018     BMP Lab Results   Component Value Date     05/21/2018    K 4.4 05/21/2018     05/21/2018    CO2 27 05/21/2018    BUN 13 05/21/2018    CREATININE <0.5 05/21/2018    GLUCOSE 85 05/21/2018    CALCIUM 8.8 05/21/2018       Urinalysis: No results found for: Tyler Herd, BLOODU, NITRU, LEUKOCYTESUR        Impression/Plan: refractory urge incontinence    Interstim stage 1 implant    Abida Hidalgo MD 5/5/70293:37 PM

## 2018-09-06 NOTE — PROGRESS NOTES
Discharge instructions reviewed with pt and , understanding verbalized. Pt concerned with the drainage on the dressing. OR charge nurse notified and will have someone change dressing.

## 2018-09-06 NOTE — ANESTHESIA POST-OP
[facility] Department of Anesthesiology  Post-Anesthesia Note       Name:  Francisca Napoles                                         Age:  52 y.o.   MRN:  [id number]     Last Vitals & Oxygen Saturation: /72   Pulse (!) 48   Temp 97.3 °F (36.3 °C) (Temporal)   Resp 16   Ht 5' 5\" (1.651 m)   Wt 165 lb 12.6 oz (75.2 kg)   LMP  (LMP Unknown)   SpO2 100%   BMI 27.59 kg/m²   Patient Vitals for the past 4 hrs:   BP Temp Temp src Pulse Resp SpO2 Height Weight   09/06/18 1604 122/72 97.3 °F (36.3 °C) Temporal (!) 48 16 100 % - -   09/06/18 1557 - 97.1 °F (36.2 °C) - 55 16 96 % - -   09/06/18 1548 120/78 - - (!) 47 14 97 % - -   09/06/18 1536 133/79 - - 58 12 99 % - -   09/06/18 1527 124/78 - - 57 14 98 % - -   09/06/18 1521 112/77 - - 59 13 99 % - -   09/06/18 1520 134/77 97.6 °F (36.4 °C) Temporal 68 12 100 % - -   09/06/18 1324 (!) 148/59 98.4 °F (36.9 °C) Temporal 58 16 98 % 5' 5\" (1.651 m) 165 lb 12.6 oz (75.2 kg)       Level of consciousness: awake, alert and oriented    Respiratory: stable     Cardiovascular: stable     Hydration: stable     PONV: stable     Post-op pain: adequate analgesia    Post-op assessment: no apparent anesthetic complications    Complications:  none    Javier Flanagan MD  September 6, 2018   4:22 PM

## 2018-09-06 NOTE — ANESTHESIA PRE-OP
[facility] Department of Anesthesiology  Pre-Anesthesia Evaluation/Consultation       Name:  Sierra Porras  : 1965  Age:  52 y. o. MRN:  [id number]  Date: 2018           Procedure (Scheduled):  interstim stage 1  Surgeon:  Dr. Romeo Rainey      Allergies   Allergen Reactions    Codeine Anxiety and Rash     Pt. Sees things crawling down walls, BP bottoms out     Patient Active Problem List   Diagnosis    Closed fracture of fourth toe of left foot    Continuous leakage of urine     Past Medical History:   Diagnosis Date    Chronic neck pain     Closed fracture of right foot 2018    PONV (postoperative nausea and vomiting)     Urinary incontinence      Past Surgical History:   Procedure Laterality Date    CATARACT REMOVAL Bilateral     CHOLECYSTECTOMY      FOOT SURGERY Right 2018    rt 4th digit arthroplasty of rt foot    JOINT REPLACEMENT Left     knee    LUNG SURGERY Left     lower lobe    SPINE SURGERY       Social History   Substance Use Topics    Smoking status: Never Smoker    Smokeless tobacco: Never Used    Alcohol use 0.6 - 1.2 oz/week     1 - 2 Glasses of wine per week     Medications  Current Outpatient Prescriptions on File Prior to Encounter   Medication Sig Dispense Refill    Multiple Vitamins-Minerals (MULTI COMPLETE PO) Take by mouth      Misc Natural Products (GLUCOSAMINE CHOND COMPLEX/MSM PO) Take by mouth      Omega-3 Fatty Acids (FISH OIL PO) Take 2 g by mouth       No current facility-administered medications on file prior to encounter.       Current Outpatient Prescriptions   Medication Sig Dispense Refill    Multiple Vitamins-Minerals (MULTI COMPLETE PO) Take by mouth      Misc Natural Products (GLUCOSAMINE CHOND COMPLEX/MSM PO) Take by mouth      Omega-3 Fatty Acids (FISH OIL PO) Take 2 g by mouth       Current Facility-Administered Medications   Medication Dose Route Frequency Provider Last Rate Last Dose  0.9 % sodium chloride infusion   Intravenous Continuous Marichuy Fitzgerald MD        sodium chloride flush 0.9 % injection 10 mL  10 mL Intravenous 2 times per day Marichuy Fitzgerald MD        sodium chloride flush 0.9 % injection 10 mL  10 mL Intravenous PRN Marichuy Fitzgerald MD        cefTRIAXone (ROCEPHIN) 2 g IVPB in D5W 50ml minibag  2 g Intravenous Once Lori Stein MD         Vital Signs (Current) There were no vitals filed for this visit. Vital Signs Statistics (for past 48 hrs)     No Data Recorded    BP Readings from Last 3 Encounters:   08/27/18 110/80   07/10/18 132/78   06/26/18 132/78     BMI  There is no height or weight on file to calculate BMI. Estimated body mass index is 28.29 kg/m² as calculated from the following:    Height as of 9/5/18: 5' 5\" (1.651 m). Weight as of 9/5/18: 170 lb (77.1 kg). CBC   Lab Results   Component Value Date    WBC 3.8 05/21/2018    RBC 4.10 05/21/2018    HGB 14.0 05/21/2018    HCT 40.2 05/21/2018    MCV 98.1 05/21/2018    RDW 13.2 05/21/2018     05/21/2018     CMP    Lab Results   Component Value Date     05/21/2018    K 4.4 05/21/2018     05/21/2018    CO2 27 05/21/2018    BUN 13 05/21/2018    CREATININE <0.5 05/21/2018    GFRAA >60 05/21/2018    AGRATIO 1.8 05/21/2018    LABGLOM >60 05/21/2018    GLUCOSE 85 05/21/2018    PROT 7.0 05/21/2018    CALCIUM 8.8 05/21/2018    BILITOT 0.3 05/21/2018    ALKPHOS 54 05/21/2018    AST 63 05/21/2018    ALT 65 05/21/2018     BMP    Lab Results   Component Value Date     05/21/2018    K 4.4 05/21/2018     05/21/2018    CO2 27 05/21/2018    BUN 13 05/21/2018    CREATININE <0.5 05/21/2018    CALCIUM 8.8 05/21/2018    GFRAA >60 05/21/2018    LABGLOM >60 05/21/2018    GLUCOSE 85 05/21/2018     POCGlucose  No results for input(s): GLUCOSE in the last 72 hours.    Coags  No results found for: PROTIME, INR, APTT  HCG (If Applicable) No results found for: PREGTESTUR, PREGSERUM, HCG, HCGQUANT   ABGs No results found for: PHART, PO2ART, MNY7WAO, RFG3MAR, BEART, M8DPGMME   Type & Screen (If Applicable)  No results found for: LABABO, LABRH                         BMI: Wt Readings from Last 3 Encounters:       NPO Status: >8hrs                           Anesthesia Evaluation  Patient summary reviewed   history of anesthetic complications: difficult airway. Airway: Mallampati: II  TM distance: >3 FB   Neck ROM: limited  Mouth opening: > = 3 FB Dental:      Comment: No loose teeth    Pulmonary: breath sounds clear to auscultation      (-) COPD, asthma, shortness of breath, recent URI and sleep apnea                           Cardiovascular:        (-) hypertension, valvular problems/murmurs, past MI, CAD, CABG/stent, dysrhythmias,  angina,  CHF and orthopnea      Rhythm: regular  Rate: normal                    Neuro/Psych:      (-) seizures, neuromuscular disease (chronic neck pain), TIA, CVA, headaches and psychiatric history           GI/Hepatic/Renal:        (-) GERD (in past), PUD, hepatitis, liver disease, no renal disease and bowel prep       Endo/Other:        (-) diabetes mellitus, hypothyroidism, hyperthyroidism, blood dyscrasia, arthritis, no electrolyte abnormalities               Abdominal:           Vascular:                                        Anesthesia Plan      TIVA     ASA 2       Induction: intravenous. Anesthetic plan and risks discussed with patient. Plan discussed with CRNA. This pre-anesthesia assessment may be used as a history and physical.    DOS STAFF ADDENDUM:    Pt seen and examined, chart reviewed (including anesthesia, drug and allergy history). No interval changes to history and physical examination. Anesthetic plan, risks, benefits, alternatives, and personnel involved discussed with patient. Patient verbalized an understanding and agrees to proceed.       Nancy Kay MD  September 6, 2018  1:21 PM

## 2018-09-06 NOTE — BRIEF OP NOTE
Brief Postoperative Note    Ruma Napoles  YOB: 1965  [id number]    Pre-operative Diagnosis: urge incontinence     Post-operative Diagnosis: Same    Procedure: stage 1 interstim implant, flouroscopy    Anesthesia: MAC    Surgeons/Assistants: Madalyn Fernandez    Estimated Blood Loss: less than 50     Complications: None    Specimens: Was Not Obtained    Findings: lead placed in S3 with good response at each lead    Electronically signed by Madalyn Fernandez MD on 9/6/2018 at 3:26 PM

## 2018-09-07 NOTE — OP NOTE
under  fluoroscopy. The J-hook was then connected and she was noted to have good  karol and toe flexion response. The obturator was removed from the  needle and the directional guide was advanced through the needle. The  needle was withdrawn. A 1-cm incision was made along the directional guide. The dilating sheath  was advanced over the directional guide into the foramen. The directional  guide and the obturator were removed. The lead was then advanced through  via dilating sheath into the foramen. I made several different attempts to  advance the lead. Each time, I was only able to get three out of the four  leads with good response. I elected to try to get a better insertion site. On the patient's left side, a centimeter proximal to the previous insertion  site, another site was marked and anesthetized. The insertion needle was  then inserted here into the S3 foramen at a somewhat sharper angle. Karol and toe flexion were good here, so the obturator was removed and  the directional guide was placed through the needle into the foramen. A 1-cm incision was made and the needle was removed. The dilating sheath  was advanced over the directional guide into the foramen. Again, multiple  attempts of lead placement here were unsuccessful with getting appropriate  response at all four leads. I then removed this dilating sheath and made a  third attempt now on the right side about a centimeter proximal to the  initially marked site. The needle was advanced in the same fashion into  the foramen. The needle was tested with good karol and toe flexion  response. The obturator was removed. The directional guide was placed and the needle  was removed. A 1-cm incision was made here. The dilating sheath was  advanced over the directional guide. Then, the directional guide and the  obturator were removed. The lead was advanced through the dilating sheath  into the foramen.   Now, each of the four leads were tested and she was  noted to have good kimberly and toe flexion response to each of the four  leads at a setting of 2. I was pleased with this placement. The dilating sheath was then removed in order to deploy the lead. The  pocket was created over the right buttock. A 5-cm transverse incision was  made, and then Bovie electrocautery was used to dissect down through the  subcutaneous fat. Blunt dissection was used to create a pocket. The  tunneling device was advanced through the lead insertion site over to the  pocket. The proximal end of the lead was passed through the tunneling  device to the pocket. The tunneling sheath was removed. The lead was  connected to the temporary wire using the plastic boot and Prolene stitch. The tunneling device was then used to tunnel over from the pocket to the  left lower back. The proximal end of the temporary wire was then passed  through the tunneling sheath and the tunneling sheath was removed. The pocket was copiously irrigated and then closed in two layers with  Vicryl stitch followed by Dermabond. Each of the attempted lead insertion  sites were closed with Vicryl stitch followed by Dermabond. A dry sterile  dressing was placed over the temporary wire. The patient was then awakened  and transferred to recovery in stable condition, having tolerated the  procedure well. She will follow up in the office in one week.         Tosha Abraham MD    D: 09/06/2018 15:33:17       T: 09/06/2018 18:00:47     [email or web address]  Job#: [id number]     Doc#: [id number]    CC:

## 2018-09-19 ENCOUNTER — PAT TELEPHONE (OUTPATIENT)
Dept: PREADMISSION TESTING | Age: 53
End: 2018-09-19

## 2018-09-19 VITALS — HEIGHT: 65 IN | WEIGHT: 165 LBS | BODY MASS INDEX: 27.49 KG/M2

## 2018-09-19 ASSESSMENT — PAIN DESCRIPTION - LOCATION: LOCATION: NECK

## 2018-09-19 ASSESSMENT — PAIN DESCRIPTION - FREQUENCY: FREQUENCY: CONTINUOUS

## 2018-09-19 ASSESSMENT — PAIN DESCRIPTION - DESCRIPTORS: DESCRIPTORS: ACHING

## 2018-09-19 ASSESSMENT — PAIN - FUNCTIONAL ASSESSMENT: PAIN_FUNCTIONAL_ASSESSMENT: 0-10

## 2018-09-19 ASSESSMENT — PAIN SCALES - GENERAL: PAINLEVEL_OUTOF10: 5

## 2018-09-19 ASSESSMENT — PAIN DESCRIPTION - PAIN TYPE: TYPE: CHRONIC PAIN

## 2018-09-19 NOTE — PRE-PROCEDURE INSTRUCTIONS
body piercing's on the day of surgery. All jewelry must be removed. If you have dentures, they will be removed before going to operating room. For your convenience, we will provide you with a container. If you wear contact lenses or glasses, they will be removed, please bring a case for them. If you have a living will and a durable power of  for healthcare, please bring in a copy. As part of our patient safety program to minimize surgical site infections, we ask you to do the following:    · Please notify your surgeon if you develop any illness between         now and the  day of your surgery. · This includes a cough, cold, fever, sore throat, nausea,         or vomiting, and diarrhea, etc.  ·  Please notify your surgeon if you experience dizziness, shortness         of breath or blurred vision between now and the time of your surgery. Do not shave your operative site 96 hours prior to surgery. For face and neck surgery, men may use an electric razor 48 hours   prior to surgery. You may shower the night before surgery or the morning of   your surgery with an antibacterial soap. You will need to bring a photo ID and insurance card    [facility] has an onsite pharmacy, would you like to utilize our pharmacy     If you will be staying overnight and use a C-pap machine, please bring   your C-pap to hospital     Our goal is to provide you with excellent care, therefore, visitors will be limited to two(2) in the room at a time so that we may focus on providing this care for you. Please contact pre-admission testing if you have any further questions. [facility] phone number:  [phone] [facility] fax number:  [phone]  Please note these are generalized instructions for all surgical cases, you may be provided with more specific instructions according to your surgery.

## 2018-09-20 ENCOUNTER — HOSPITAL ENCOUNTER (OUTPATIENT)
Dept: SURGERY | Age: 53
Discharge: HOME | End: 2018-09-20
Attending: UROLOGY | Admitting: UROLOGY

## 2018-09-20 VITALS
HEART RATE: 57 BPM | BODY MASS INDEX: 27.49 KG/M2 | TEMPERATURE: 97.8 F | OXYGEN SATURATION: 98 % | HEIGHT: 65 IN | SYSTOLIC BLOOD PRESSURE: 104 MMHG | DIASTOLIC BLOOD PRESSURE: 73 MMHG | RESPIRATION RATE: 16 BRPM | WEIGHT: 165 LBS

## 2018-09-20 DIAGNOSIS — N39.41 URGE INCONTINENCE: Primary | ICD-10-CM

## 2018-09-20 DIAGNOSIS — G89.18 POST-OPERATIVE PAIN: ICD-10-CM

## 2018-09-20 RX ORDER — PROMETHAZINE HYDROCHLORIDE 25 MG/ML
6.25 INJECTION, SOLUTION INTRAMUSCULAR; INTRAVENOUS
Status: ACTIVE | OUTPATIENT
Start: 2018-09-20 | End: 2018-09-20

## 2018-09-20 RX ORDER — LABETALOL HYDROCHLORIDE 5 MG/ML
5 INJECTION, SOLUTION INTRAVENOUS EVERY 10 MIN PRN
Status: DISCONTINUED | OUTPATIENT
Start: 2018-09-20 | End: 2018-09-21 | Stop reason: HOSPADM

## 2018-09-20 RX ORDER — TRAMADOL HYDROCHLORIDE 50 MG/1
50 TABLET ORAL EVERY 6 HOURS PRN
Qty: 12 TABLET | Refills: 0 | Status: SHIPPED | OUTPATIENT
Start: 2018-09-20 | End: 2018-09-23

## 2018-09-20 RX ORDER — TRAMADOL HYDROCHLORIDE 50 MG/1
50 TABLET ORAL ONCE
Status: COMPLETED | OUTPATIENT
Start: 2018-09-20 | End: 2018-09-20

## 2018-09-20 RX ORDER — SODIUM CHLORIDE 0.9 % (FLUSH) 0.9 %
10 SYRINGE (ML) INJECTION PRN
Status: DISCONTINUED | OUTPATIENT
Start: 2018-09-20 | End: 2018-09-21 | Stop reason: HOSPADM

## 2018-09-20 RX ORDER — SODIUM CHLORIDE 9 MG/ML
INJECTION, SOLUTION INTRAVENOUS CONTINUOUS
Status: DISCONTINUED | OUTPATIENT
Start: 2018-09-20 | End: 2018-09-21 | Stop reason: HOSPADM

## 2018-09-20 RX ORDER — FENTANYL CITRATE 50 UG/ML
25 INJECTION, SOLUTION INTRAMUSCULAR; INTRAVENOUS EVERY 5 MIN PRN
Status: DISCONTINUED | OUTPATIENT
Start: 2018-09-20 | End: 2018-09-21 | Stop reason: HOSPADM

## 2018-09-20 RX ORDER — SODIUM CHLORIDE 0.9 % (FLUSH) 0.9 %
10 SYRINGE (ML) INJECTION EVERY 12 HOURS SCHEDULED
Status: DISCONTINUED | OUTPATIENT
Start: 2018-09-20 | End: 2018-09-21 | Stop reason: HOSPADM

## 2018-09-20 RX ADMIN — TRAMADOL HYDROCHLORIDE 50 MG: 50 TABLET ORAL at 16:16

## 2018-09-20 RX ADMIN — SODIUM CHLORIDE: 9 INJECTION, SOLUTION INTRAVENOUS at 14:15

## 2018-09-20 ASSESSMENT — PAIN SCALES - GENERAL
PAINLEVEL_OUTOF10: 5
PAINLEVEL_OUTOF10: 4
PAINLEVEL_OUTOF10: 6

## 2018-09-20 ASSESSMENT — PAIN DESCRIPTION - LOCATION: LOCATION: BUTTOCKS

## 2018-09-20 ASSESSMENT — PAIN DESCRIPTION - PAIN TYPE: TYPE: SURGICAL PAIN

## 2018-09-20 ASSESSMENT — PAIN DESCRIPTION - ORIENTATION: ORIENTATION: RIGHT

## 2018-09-20 ASSESSMENT — ENCOUNTER SYMPTOMS: SHORTNESS OF BREATH: 0

## 2018-09-20 ASSESSMENT — PAIN DESCRIPTION - DESCRIPTORS: DESCRIPTORS: STABBING

## 2018-09-20 ASSESSMENT — PAIN - FUNCTIONAL ASSESSMENT: PAIN_FUNCTIONAL_ASSESSMENT: 0-10

## 2018-09-20 NOTE — INTERVAL H&P NOTE
H&P Update    Patient's History and Physical  was reviewed. Patient examined. There has been no change. Stage 1 interstim was helpful.   SHe is here for stage 2 implant    Ophelia Gold

## 2018-09-21 NOTE — OP NOTE
[address] Andrew [address]                                 OPERATIVE REPORT    PATIENT NAME: Theresa Ordaz                      :        1965  MED REC NO:   [id number]                          ROOM:  ACCOUNT NO:   [de-identified]                          ADMIT DATE: 2018  PROVIDER:     Herman Rae MD    DATE OF PROCEDURE:  2018    PREOPERATIVE DIAGNOSIS:  Urgency incontinence. POSTOPERATIVE DIAGNOSIS:  Urgency incontinence. OPERATION PERFORMED:  Stage II InterStim implant. SURGEON:  Herman Rae MD    ANESTHESIA:  TIVA. COMPLICATIONS:  None. BLOOD LOSS:  Minimal.    INDICATIONS:  A 64-year-old female with urge incontinence. She had a stage  I InterStim implant two weeks ago. She has had an excellent response with  significant improvement in her symptoms. She is here for stage II implant. OPERATIVE PROCEDURE:  She was given IV Rocephin and taken to the operative  suite. She was moved onto the table in the prone position. Anesthesia was  induced. Her lower back and buttocks were prepped and draped. The  previous pocket site over the right buttock was anesthetized with a 50:50  solution of lidocaine and Marcaine. The incision was reopened with a #15  blade. I dissected down to the temporary connection, which was pulled into  the field. The plastic boot was removed and the lead was removed from the  temporary connection. The temporary connector was then cut away and pulled  out of the patient. The pocket was irrigated with antibiotic solution. The lead was connected to the IPG. The IPG was placed in the pocket. The  pocket was again irrigated with sterile water with gentamicin. Impendence  was checked, which was excellent at each of the four leads. The pocket was  then closed in two layers with Vicryl stitch followed by Skin Affix.   The  patient was awakened

## 2018-09-28 ENCOUNTER — OFFICE VISIT (OUTPATIENT)
Dept: ORTHOPEDIC SURGERY | Age: 53
End: 2018-09-28
Payer: COMMERCIAL

## 2018-09-28 VITALS — WEIGHT: 165 LBS | BODY MASS INDEX: 27.49 KG/M2 | HEIGHT: 65 IN | RESPIRATION RATE: 16 BRPM

## 2018-09-28 DIAGNOSIS — M79.671 RIGHT FOOT PAIN: Primary | ICD-10-CM

## 2018-09-28 PROCEDURE — 99213 OFFICE O/P EST LOW 20 MIN: CPT | Performed by: ORTHOPAEDIC SURGERY
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
## 2018-10-15 ENCOUNTER — OFFICE VISIT (OUTPATIENT)
Dept: FAMILY MEDICINE CLINIC | Age: 53
End: 2018-10-15
Payer: COMMERCIAL

## 2018-10-15 VITALS
HEIGHT: 65 IN | WEIGHT: 173 LBS | TEMPERATURE: 98.2 F | BODY MASS INDEX: 28.82 KG/M2 | DIASTOLIC BLOOD PRESSURE: 78 MMHG | SYSTOLIC BLOOD PRESSURE: 120 MMHG

## 2018-10-15 DIAGNOSIS — M54.2 CERVICAL PAIN: ICD-10-CM

## 2018-10-15 DIAGNOSIS — M54.2 CERVICAL PAIN (NECK): Primary | ICD-10-CM

## 2018-10-15 PROCEDURE — 99213 OFFICE O/P EST LOW 20 MIN: CPT | Performed by: NURSE PRACTITIONER

## 2018-10-15 NOTE — PROGRESS NOTES
Bowel sounds are normal.   Musculoskeletal: Normal range of motion. Lymphadenopathy:     She has no cervical adenopathy. Neurological: She is alert and oriented to person, place, and time. Skin: Skin is warm and dry. Psychiatric: She has a normal mood and affect. Her behavior is normal. Judgment and thought content normal.   Nursing note and vitals reviewed. Vitals:    10/15/18 1644   BP: 120/78   Temp: 98.2 °F (36.8 °C)       Assessment:  Encounter Diagnoses   Name Primary?     Cervical pain (neck) Yes    Cervical pain        Controlled SubstancesMonitoring:  NA    Plan:    Cervical pain  Referral to Radiology Dr. Idalia Hill for possible steroid injections to the neck  Please evaluate and treat

## 2018-10-16 ENCOUNTER — TELEPHONE (OUTPATIENT)
Dept: FAMILY MEDICINE CLINIC | Age: 53
End: 2018-10-16

## 2018-10-16 PROBLEM — M54.2 CERVICAL PAIN: Status: ACTIVE | Noted: 2018-10-16

## 2018-10-23 ENCOUNTER — HOSPITAL ENCOUNTER (OUTPATIENT)
Dept: INTERVENTIONAL RADIOLOGY/VASCULAR | Age: 53
Discharge: HOME | End: 2018-10-23
Payer: COMMERCIAL

## 2018-10-23 DIAGNOSIS — M54.2 CERVICALGIA: ICD-10-CM

## 2018-10-23 PROCEDURE — 6360000002 HC RX W HCPCS

## 2018-10-23 PROCEDURE — 2709999900 HC NON-CHARGEABLE SUPPLY

## 2018-10-23 PROCEDURE — 62321 NJX INTERLAMINAR CRV/THRC: CPT

## 2018-11-06 ENCOUNTER — TELEPHONE (OUTPATIENT)
Dept: FAMILY MEDICINE CLINIC | Age: 53
End: 2018-11-06

## 2018-11-12 ENCOUNTER — HOSPITAL ENCOUNTER (OUTPATIENT)
Dept: INTERVENTIONAL RADIOLOGY/VASCULAR | Age: 53
Discharge: HOME | End: 2018-11-12
Attending: RADIOLOGY | Admitting: RADIOLOGY
Payer: COMMERCIAL

## 2018-11-12 PROCEDURE — 62321 NJX INTERLAMINAR CRV/THRC: CPT

## 2018-11-12 PROCEDURE — 6360000002 HC RX W HCPCS

## 2018-11-12 PROCEDURE — 2709999900 HC NON-CHARGEABLE SUPPLY

## 2018-12-29 ENCOUNTER — TELEPHONE (OUTPATIENT)
Dept: OTHER | Age: 53
End: 2018-12-29

## 2019-01-22 ENCOUNTER — OFFICE VISIT (OUTPATIENT)
Dept: FAMILY MEDICINE CLINIC | Age: 54
End: 2019-01-22
Payer: COMMERCIAL

## 2019-01-22 VITALS
HEART RATE: 66 BPM | OXYGEN SATURATION: 98 % | SYSTOLIC BLOOD PRESSURE: 108 MMHG | DIASTOLIC BLOOD PRESSURE: 64 MMHG | TEMPERATURE: 97.9 F
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
## 2019-01-22 DIAGNOSIS — M54.2 NECK PAIN: Primary | ICD-10-CM

## 2019-01-22 DIAGNOSIS — M62.838 MUSCLE SPASM: ICD-10-CM

## 2019-01-22 PROCEDURE — 99213 OFFICE O/P EST LOW 20 MIN: CPT | Performed by: NURSE PRACTITIONER

## 2019-01-22 RX ORDER — CYCLOBENZAPRINE HCL 10 MG
10 TABLET ORAL 2 TIMES DAILY PRN
Qty: 28 TABLET | Refills: 0 | Status: SHIPPED | OUTPATIENT
Start: 2019-01-22 | End: 2019-02-05

## 2019-01-22 RX ORDER — IBUPROFEN 800 MG/1
800 TABLET ORAL EVERY 6 HOURS PRN
Qty: 90 TABLET | Refills: 0 | Status: SHIPPED | OUTPATIENT
Start: 2019-01-22 | End: 2019-02-17 | Stop reason: SDUPTHER

## 2019-01-24 ENCOUNTER — OFFICE VISIT (OUTPATIENT)
Dept: ORTHOPEDIC SURGERY | Age: 54
End: 2019-01-24
Payer: COMMERCIAL

## 2019-01-24 VITALS
SYSTOLIC BLOOD PRESSURE: 102 MMHG | DIASTOLIC BLOOD PRESSURE: 69 MMHG | HEIGHT: 65 IN | BODY MASS INDEX: 28.32 KG/M2 | WEIGHT: 170 LBS | HEART RATE: 79 BPM

## 2019-01-24 DIAGNOSIS — M75.82 ROTATOR CUFF TENDONITIS, LEFT: ICD-10-CM

## 2019-01-24 DIAGNOSIS — M25.512 ACUTE PAIN OF LEFT SHOULDER: ICD-10-CM

## 2019-01-24 DIAGNOSIS — M67.922 BICEPS TENDINOPATHY, LEFT: ICD-10-CM

## 2019-01-24 DIAGNOSIS — M54.2 NECK PAIN: Primary | ICD-10-CM
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
## 2019-01-24 PROCEDURE — 99203 OFFICE O/P NEW LOW 30 MIN: CPT | Performed by: ORTHOPAEDIC SURGERY

## 2019-01-24 RX ORDER — MELOXICAM 15 MG/1
15 TABLET ORAL DAILY PRN
Qty: 30 TABLET | Refills: 0 | Status: SHIPPED | OUTPATIENT
Start: 2019-01-24 | End: 2019-03-12

## 2019-01-24 RX ORDER — METHYLPREDNISOLONE 4 MG/1
TABLET ORAL
Qty: 1 KIT | Refills: 0 | Status: SHIPPED | OUTPATIENT
Start: 2019-01-24 | End: 2019-01-30

## 2019-03-04 ENCOUNTER — TELEPHONE (OUTPATIENT)
Dept: FAMILY MEDICINE CLINIC | Age: 54
End: 2019-03-04

## 2019-03-12 ENCOUNTER — HOSPITAL ENCOUNTER (OUTPATIENT)
Dept: PHYSICAL THERAPY | Age: 54
Setting detail: THERAPIES SERIES
Discharge: HOME | End: 2019-03-12
Payer: COMMERCIAL

## 2019-03-12 PROCEDURE — 97140 MANUAL THERAPY 1/> REGIONS: CPT | Performed by: PHYSICAL THERAPIST

## 2019-03-12 PROCEDURE — 97530 THERAPEUTIC ACTIVITIES: CPT | Performed by: PHYSICAL THERAPIST

## 2019-03-12 PROCEDURE — 97162 PT EVAL MOD COMPLEX 30 MIN: CPT | Performed by: PHYSICAL THERAPIST

## 2019-03-12 PROCEDURE — 97110 THERAPEUTIC EXERCISES: CPT | Performed by: PHYSICAL THERAPIST

## 2019-03-19 ENCOUNTER — HOSPITAL ENCOUNTER (OUTPATIENT)
Dept: PHYSICAL THERAPY | Age: 54
Setting detail: THERAPIES SERIES
Discharge: HOME | End: 2019-03-19
Payer: COMMERCIAL

## 2019-03-19 PROCEDURE — 97530 THERAPEUTIC ACTIVITIES: CPT | Performed by: PHYSICAL THERAPIST

## 2019-03-19 PROCEDURE — 97110 THERAPEUTIC EXERCISES: CPT | Performed by: PHYSICAL THERAPIST

## 2019-03-19 PROCEDURE — 97140 MANUAL THERAPY 1/> REGIONS: CPT | Performed by: PHYSICAL THERAPIST

## 2019-04-04 ENCOUNTER — HOSPITAL ENCOUNTER (OUTPATIENT)
Dept: PHYSICAL THERAPY | Age: 54
Setting detail: THERAPIES SERIES
End: 2019-04-04
Payer: COMMERCIAL

## 2019-04-16 ENCOUNTER — HOSPITAL ENCOUNTER (OUTPATIENT)
Dept: PHYSICAL THERAPY | Age: 54
Setting detail: THERAPIES SERIES
Discharge: HOME | End: 2019-04-16
Payer: COMMERCIAL

## 2019-04-16 PROCEDURE — 97110 THERAPEUTIC EXERCISES: CPT | Performed by: PHYSICAL THERAPIST

## 2019-04-16 PROCEDURE — 97140 MANUAL THERAPY 1/> REGIONS: CPT | Performed by: PHYSICAL THERAPIST

## 2019-04-16 PROCEDURE — 97530 THERAPEUTIC ACTIVITIES: CPT | Performed by: PHYSICAL THERAPIST
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
## 2019-04-23 ENCOUNTER — HOSPITAL ENCOUNTER (OUTPATIENT)
Dept: PHYSICAL THERAPY | Age: 54
Setting detail: THERAPIES SERIES
Discharge: HOME | End: 2019-04-23
Payer: COMMERCIAL

## 2019-04-23 PROCEDURE — 97530 THERAPEUTIC ACTIVITIES: CPT | Performed by: PHYSICAL THERAPIST

## 2019-04-23 PROCEDURE — 97140 MANUAL THERAPY 1/> REGIONS: CPT | Performed by: PHYSICAL THERAPIST

## 2019-04-23 PROCEDURE — 97110 THERAPEUTIC EXERCISES: CPT | Performed by: PHYSICAL THERAPIST
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
## 2019-04-30 ENCOUNTER — HOSPITAL ENCOUNTER (OUTPATIENT)
Dept: PHYSICAL THERAPY | Age: 54
Setting detail: THERAPIES SERIES
Discharge: HOME | End: 2019-04-30
Payer: COMMERCIAL

## 2019-04-30 NOTE — FLOWSHEET NOTE
[facility], [address]  Phone: [phone]  Fax [phone]          Physical Therapy  Cancellation/No-show Note  Patient Name:  Leonila Cavazos  :  1965   Date:  2019  Cancelled visits to date: 1  No-shows to date: 0    For today's appointment patient:  [x]  Cancelled  []  Rescheduled appointment  []  No-show     Reason given by patient:  []  Patient ill  [x]  Conflicting appointment  []  No transportation    []  Conflict with work  []  No reason given  []  Other:    Comments:  Pt does have another appt scheduled.     Electronically signed by:  Beau Reid PT, Yen Stubbs 87, OMT-C    Physical Therapist [address] [place] [address] license #[id number]  Physical Therapist [place] license #[id number]

## 2019-05-07 ENCOUNTER — HOSPITAL ENCOUNTER (OUTPATIENT)
Dept: PHYSICAL THERAPY | Age: 54
Setting detail: THERAPIES SERIES
Discharge: HOME | End: 2019-05-07
Payer: COMMERCIAL

## 2019-05-07 PROCEDURE — 97530 THERAPEUTIC ACTIVITIES: CPT | Performed by: PHYSICAL THERAPIST

## 2019-05-07 PROCEDURE — 97110 THERAPEUTIC EXERCISES: CPT | Performed by: PHYSICAL THERAPIST

## 2019-05-07 PROCEDURE — 97140 MANUAL THERAPY 1/> REGIONS: CPT | Performed by: PHYSICAL THERAPIST
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
## 2019-05-14 DIAGNOSIS — M54.2 NECK PAIN: Primary | ICD-10-CM

## 2019-05-14 RX ORDER — IBUPROFEN 800 MG/1
800 TABLET ORAL EVERY 8 HOURS PRN
Qty: 270 TABLET | Refills: 3 | Status: SHIPPED | OUTPATIENT
Start: 2019-05-14

## 2019-05-14 NOTE — TELEPHONE ENCOUNTER
I never denied a request for this medication. Sent in refill. confirmed by pharmacy. Electronically and I called this morning. Pharmacy states that I denied script 5/13/19. Do not see any record of this. Sent in Ibuprofen 800 mg tablets up to three times a day with food. 90 day supply sent, 3 refills given. Recommended patient take with food to prevent PUD. Patient verbalized understanding. Explained risks of PUD if taking both this and Meloxicam. States she will only take the ibuprofen. Mary Gonzalez.  Luiz Pagan.  5/14/2019   1:14 PM

## 2019-05-16 ENCOUNTER — HOSPITAL ENCOUNTER (OUTPATIENT)
Dept: PHYSICAL THERAPY | Age: 54
Setting detail: THERAPIES SERIES
Discharge: HOME | End: 2019-05-16
Payer: COMMERCIAL

## 2019-05-16 PROCEDURE — 97110 THERAPEUTIC EXERCISES: CPT | Performed by: PHYSICAL THERAPIST

## 2019-05-16 PROCEDURE — 97140 MANUAL THERAPY 1/> REGIONS: CPT | Performed by: PHYSICAL THERAPIST
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
## 2019-05-23 ENCOUNTER — APPOINTMENT (OUTPATIENT)
Dept: PHYSICAL THERAPY | Age: 54
End: 2019-05-23
Payer: COMMERCIAL

## 2019-05-24 ENCOUNTER — OFFICE VISIT (OUTPATIENT)
Dept: FAMILY MEDICINE CLINIC | Age: 54
End: 2019-05-24
Payer: COMMERCIAL

## 2019-05-24 VITALS
HEART RATE: 79 BPM | BODY MASS INDEX: 29.12 KG/M2 | WEIGHT: 175 LBS | SYSTOLIC BLOOD PRESSURE: 110 MMHG | OXYGEN SATURATION: 96 % | DIASTOLIC BLOOD PRESSURE: 70 MMHG | TEMPERATURE: 99 F

## 2019-05-24 DIAGNOSIS — J20.9 BRONCHOSPASM WITH BRONCHITIS, ACUTE: ICD-10-CM

## 2019-05-24 DIAGNOSIS — R06.2 WHEEZING: ICD-10-CM

## 2019-05-24 DIAGNOSIS — R53.81 MALAISE AND FATIGUE: ICD-10-CM

## 2019-05-24 DIAGNOSIS — R11.2 NAUSEA AND VOMITING, INTRACTABILITY OF VOMITING NOT SPECIFIED, UNSPECIFIED VOMITING TYPE: ICD-10-CM

## 2019-05-24 DIAGNOSIS — R53.83 MALAISE AND FATIGUE: ICD-10-CM

## 2019-05-24 DIAGNOSIS — R68.89 SENSATION OF FEELING HOT: ICD-10-CM

## 2019-05-24 DIAGNOSIS — J18.9 ATYPICAL PNEUMONIA: Primary | ICD-10-CM

## 2019-05-24 DIAGNOSIS — R05.9 COUGH: ICD-10-CM

## 2019-05-24 LAB
BASOPHILS ABSOLUTE: 0 K/UL (ref 0–0.2)
BASOPHILS RELATIVE PERCENT: 0.5 %
EOSINOPHILS ABSOLUTE: 0.1 K/UL (ref 0–0.6)
EOSINOPHILS RELATIVE PERCENT: 1.2 %
HCT VFR BLD CALC: 40.7 % (ref 36–48)
HEMOGLOBIN: 14 G/DL (ref 12–16)
INFLUENZA A ANTIBODY: NEGATIVE
INFLUENZA B ANTIBODY: NEGATIVE
LYMPHOCYTES ABSOLUTE: 0.8 K/UL (ref 1–5.1)
LYMPHOCYTES RELATIVE PERCENT: 16.5 %
MCH RBC QN AUTO: 33.7 PG (ref 26–34)
MCHC RBC AUTO-ENTMCNC: 34.3 G/DL (ref 31–36)
MCV RBC AUTO: 98.1 FL (ref 80–100)
MONOCYTES ABSOLUTE: 0.7 K/UL (ref 0–1.3)
MONOCYTES RELATIVE PERCENT: 13.2 %
NEUTROPHILS ABSOLUTE: 3.4 K/UL (ref 1.7–7.7)
NEUTROPHILS RELATIVE PERCENT: 68.6 %
PDW BLD-RTO: 13.4 % (ref 12.4–15.4)
PLATELET # BLD: 169 K/UL (ref 135–450)
PMV BLD AUTO: 9.2 FL (ref 5–10.5)
RBC # BLD: 4.15 M/UL (ref 4–5.2)
WBC # BLD: 4.9 K/UL (ref 4–11)

## 2019-05-24 PROCEDURE — 99214 OFFICE O/P EST MOD 30 MIN: CPT | Performed by: FAMILY MEDICINE

## 2019-05-24 PROCEDURE — 36415 COLL VENOUS BLD VENIPUNCTURE: CPT | Performed by: FAMILY MEDICINE

## 2019-05-24 PROCEDURE — 87804 INFLUENZA ASSAY W/OPTIC: CPT | Performed by: FAMILY MEDICINE

## 2019-05-24 RX ORDER — METHYLPREDNISOLONE 4 MG/1
TABLET ORAL
Qty: 21 TABLET | Refills: 0 | Status: SHIPPED | OUTPATIENT
Start: 2019-05-24 | End: 2019-05-30

## 2019-05-24 RX ORDER — AZITHROMYCIN 250 MG/1
250 TABLET, FILM COATED ORAL SEE ADMIN INSTRUCTIONS
Qty: 6 TABLET | Refills: 0 | Status: SHIPPED | OUTPATIENT
Start: 2019-05-24 | End: 2019-05-29

## 2019-05-24 RX ORDER — ALBUTEROL SULFATE 90 UG/1
2 AEROSOL, METERED RESPIRATORY (INHALATION) 4 TIMES DAILY PRN
Qty: 1 INHALER | Refills: 0 | Status: SHIPPED | OUTPATIENT
Start: 2019-05-24

## 2019-05-24 RX ORDER — BENZONATATE 100 MG/1
100 CAPSULE ORAL 3 TIMES DAILY PRN
Qty: 30 CAPSULE | Refills: 0 | Status: SHIPPED | OUTPATIENT
Start: 2019-05-24 | End: 2019-05-31

## 2019-05-24 ASSESSMENT — ENCOUNTER SYMPTOMS
SORE THROAT: 0
ABDOMINAL PAIN: 0
CONSTIPATION: 0
SINUS PRESSURE: 0
DIARRHEA: 0
CHEST TIGHTNESS: 1
SHORTNESS OF BREATH: 0
RHINORRHEA: 0
VOMITING: 1
NAUSEA: 1
WHEEZING: 1

## 2019-05-24 NOTE — PATIENT INSTRUCTIONS
A cough that brings up mucus from your lungs is common with pneumonia. It is one way your body gets rid of the infection. But if coughing keeps you from resting or causes severe fatigue and chest-wall pain, talk to your doctor. He or she may suggest that you take a medicine to reduce the cough. · Do not smoke or allow others to smoke around you. When should you call for help? Call 911 anytime you think you may need emergency care. For example, call if:    · You have severe trouble breathing.    Call your doctor now or seek immediate medical care if:    · You cough up dark brown or bloody mucus (sputum).     · You have new or worse trouble breathing.     · You are dizzy or lightheaded, or you feel like you may faint.    Watch closely for changes in your health, and be sure to contact your doctor if:    · You have a new or higher fever.     · Your cough or wheezing has not gone away after 2 to 4 weeks.     · You are not getting better as expected. Where can you learn more? Go to https://Inporia.dbTwang. org and sign in to your [employer] account. Enter V660 in the [employer] box to learn more about \"Walking Pneumonia: Care Instructions. \"     If you do not have an account, please click on the \"Sign Up Now\" link. Current as of: September 5, 2018  Content Version: 12.0  © 4435-3313 Healthwise, Incorporated. Care instructions adapted under license by [facility] ([facility]). If you have questions about a medical condition or this instruction, always ask your healthcare professional. [place] [address] any warranty or liability for your use of this information. Patient Education        Asthma in Adults: Care Instructions  Your Care Instructions    During an asthma attack, your airways swell and narrow as a reaction to certain things (triggers). This makes it hard to breathe. You may be able to prevent asthma attacks if you avoid the things that set off your asthma symptoms. Keeping your asthma under control and treating symptoms before they get bad can help you avoid severe attacks. If you can control your asthma, you may be able to do all of your normal daily activities. You may also avoid asthma attacks and trips to the hospital.  Follow-up care is a key part of your treatment and safety. Be sure to make and go to all appointments, and call your doctor if you are having problems. It's also a good idea to know your test results and keep a list of the medicines you take. How can you care for yourself at home? · Follow your asthma action plan so you can manage your symptoms at home. An asthma action plan will help you prevent and control airway reactions and will tell you what to do during an asthma attack. If you do not have an asthma action plan, work with your doctor to build one. · Take your asthma medicine exactly as prescribed. Medicine plays an important role in controlling asthma. Talk to your doctor right away if you have any questions about what to take and how to take it. ? Use your quick-relief medicine when you have symptoms of an attack. Quick-relief medicine often is an albuterol inhaler. Some people need to use quick-relief medicine before they exercise. ? Take your controller medicine every day, not just when you have symptoms. Controller medicine is usually an inhaled corticosteroid. The goal is to prevent problems before they occur. Do not use your controller medicine to try to treat an attack that has already started. It does not work fast enough to help. ? If your doctor prescribed corticosteroid pills to use during an attack, take them as directed. They may take hours to work, but they may shorten the attack and help you breathe better. ? Keep your quick-relief medicine with you at all times. · Talk to your doctor before using other medicines. Some medicines, such as aspirin, can cause asthma attacks in some people.   · Check yourself for asthma symptoms to know which step to follow in your action plan. Watch for things like being short of breath, having chest tightness, coughing, and wheezing. Also notice if symptoms wake you up at night or if you get tired quickly when you exercise. · If you have a peak flow meter, use it to check how well you are breathing. This can help you predict when an asthma attack is going to occur. Then you can take medicine to prevent the asthma attack or make it less severe. · See your doctor regularly. These visits will help you learn more about asthma and what you can do to control it. Your doctor will monitor your treatment to make sure the medicine is helping you. · Keep track of your asthma attacks and your treatment. After you have had an attack, write down what triggered it, what helped end it, and any concerns you have about your asthma action plan. Take your diary when you see your doctor. You can then review your asthma action plan and decide if it is working. · Do not smoke or allow others to smoke around you. Avoid smoky places. Smoking makes asthma worse. If you need help quitting, talk to your doctor about stop-smoking programs and medicines. These can increase your chances of quitting for good. · Learn what triggers an asthma attack for you, and avoid the triggers when you can. Common triggers include colds, smoke, air pollution, dust, pollen, mold, pets, cockroaches, stress, and cold air. · Avoid colds and the flu. Get a pneumococcal vaccine shot. If you have had one before, ask your doctor whether you need a second dose. Get a flu vaccine every fall. If you must be around people with colds or the flu, wash your hands often. When should you call for help? Call 911 anytime you think you may need emergency care. For example, call if:    · You have severe trouble breathing.    Call your doctor now or seek immediate medical care if:    · Your symptoms do not get better after you have followed your asthma action plan.   · You cough up yellow, dark brown, or bloody mucus (sputum).    Watch closely for changes in your health, and be sure to contact your doctor if:    · Your coughing and wheezing get worse.     · You need to use quick-relief medicine on more than 2 days a week (unless it is just for exercise).     · You need help figuring out what is triggering your asthma attacks. Where can you learn more? Go to https://SMS Assistpepiceweb.Tushky. org and sign in to your [employer] account. Enter P597 in the [employer] box to learn more about \"Asthma in Adults: Care Instructions. \"     If you do not have an account, please click on the \"Sign Up Now\" link. Current as of: September 5, 2018  Content Version: 12.0  © 2990-5349 Healthwise, Incorporated. Care instructions adapted under license by [facility] ([facility]). If you have questions about a medical condition or this instruction, always ask your healthcare professional. [address] any warranty or liability for your use of this information.

## 2019-05-24 NOTE — PROGRESS NOTES
Family History   Problem Relation Age of Onset    Cancer Father         kidney    Stroke Maternal Grandmother     Heart Attack Maternal Grandfather     Alcohol Abuse Paternal Grandfather      Current Outpatient Medications   Medication Sig Dispense Refill    azithromycin (ZITHROMAX) 250 MG tablet Take 1 tablet by mouth See Admin Instructions for 5 days 500mg on day 1 followed by 250mg on days 2 - 5 6 tablet 0    methylPREDNISolone (MEDROL DOSEPACK) 4 MG tablet Take by mouth per package instructions 21 tablet 0    albuterol sulfate  (90 Base) MCG/ACT inhaler Inhale 2 puffs into the lungs 4 times daily as needed for Wheezing 1 Inhaler 0    benzonatate (TESSALON) 100 MG capsule Take 1 capsule by mouth 3 times daily as needed for Cough 30 capsule 0    ibuprofen (ADVIL;MOTRIN) 800 MG tablet Take 1 tablet by mouth every 8 hours as needed for Pain 270 tablet 3    Multiple Vitamins-Minerals (MULTI COMPLETE PO) Take 1 tablet by mouth daily       Misc Natural Products (GLUCOSAMINE CHOND COMPLEX/MSM PO) Take by mouth      Omega-3 Fatty Acids (FISH OIL PO) Take 1 g by mouth daily        No current facility-administered medications for this visit. Allergies   Allergen Reactions    Codeine Anxiety, Rash and Anaphylaxis     Pt. Sees things crawling down walls, BP bottoms out     Social History     Socioeconomic History    Marital status:      Spouse name: Not on file    Number of children: Not on file    Years of education: Not on file    Highest education level: Not on file   Occupational History    Not on file   Social Needs    Financial resource strain: Not on file    Food insecurity:     Worry: Not on file     Inability: Not on file    Transportation needs:     Medical: Not on file     Non-medical: Not on file   Tobacco Use    Smoking status: Never Smoker    Smokeless tobacco: Never Used   Substance and Sexual Activity    Alcohol use:  Yes     Alcohol/week: 0.6 - 1.2 oz Types: 1 - 2 Glasses of wine per week    Drug use: No    Sexual activity: Yes     Birth control/protection: Post-menopausal   Lifestyle    Physical activity:     Days per week: Not on file     Minutes per session: Not on file    Stress: Not on file   Relationships    Social connections:     Talks on phone: Not on file     Gets together: Not on file     Attends [employer] service: Not on file     Active member of club or organization: Not on file     Attends meetings of clubs or organizations: Not on file     Relationship status: Not on file    Intimate partner violence:     Fear of current or ex partner: Not on file     Emotionally abused: Not on file     Physically abused: Not on file     Forced sexual activity: Not on file   Other Topics Concern    Not on file   Social History Narrative    Not on file     Immunization History   Administered Date(s) Administered    Hepatitis A Adult (Havarix) 06/21/2016, 02/28/2017    Influenza Vaccine, unspecified formulation 10/09/2018    Influenza, Quadv, 3 yrs and older, IM, PF (Fluzone 3 yrs and older or Afluria 5 yrs and older) 10/08/2018    MMR 06/21/2016    Meningococcal MPSV4 (Menomune) 06/21/2016    OPV 06/21/2017    Pneumococcal Polysaccharide ([id number]) 06/21/2017    Tdap (Boostrix, Adacel) 06/21/2016    Typhoid Vaccine, unspecified formulation 06/21/2016    Zoster Subunit (Shingrix) 03/07/2018, 09/10/2018     Past medical, surgical, and social history reviewed and updated. Medications, immunizations, and allergies reviewed and updated     Review of Systems   Constitutional: Positive for appetite change, fatigue and fever. Negative for chills. HENT: Negative for congestion, hearing loss, rhinorrhea, sinus pressure and sore throat. Eyes: Negative for visual disturbance. Respiratory: Positive for chest tightness and wheezing. Negative for shortness of breath. Cardiovascular: Positive for chest pain.    Gastrointestinal: Positive for nausea and vomiting. Negative for abdominal pain, constipation and diarrhea. Genitourinary: Negative for dysuria and hematuria. Musculoskeletal: Positive for arthralgias and myalgias. Skin: Negative for rash. Allergic/Immunologic: Negative for environmental allergies and food allergies. Neurological: Positive for headaches. OBJECTIVE:  /70 (Site: Right Upper Arm, Position: Sitting, Cuff Size: Medium Adult)   Pulse 79   Temp 99 °F (37.2 °C) (Oral)   Wt 175 lb (79.4 kg)   LMP 01/20/2017   SpO2 96%   BMI 29.12 kg/m²     Physical Exam   Constitutional: She is oriented to person, place, and time. She appears well-developed and well-nourished. She has a sickly appearance. She appears ill. No distress. HENT:   Head: Normocephalic and atraumatic. Right Ear: Tympanic membrane normal.   Left Ear: Tympanic membrane normal.   Nose: No septal deviation. Right sinus exhibits no maxillary sinus tenderness and no frontal sinus tenderness. Left sinus exhibits no maxillary sinus tenderness and no frontal sinus tenderness. Mouth/Throat: Uvula is midline and oropharynx is clear and moist.   Eyes: Pupils are equal, round, and reactive to light. EOM are normal.   Neck: Normal range of motion. Neck supple. No tracheal deviation present. Cardiovascular: Normal rate, regular rhythm and normal pulses. Pulmonary/Chest: Effort normal. No respiratory distress. She has decreased breath sounds. She has wheezes (Diffuse expiratory wheezing). She has no rhonchi. She has no rales. Abdominal: Soft. Bowel sounds are normal. She exhibits no distension. There is no hepatomegaly. There is no tenderness. Lymphadenopathy:     She has cervical adenopathy. Neurological: She is alert and oriented to person, place, and time. No cranial nerve deficit. Skin: No rash noted. Psychiatric: She has a normal mood and affect. She expresses no homicidal and no suicidal ideation.      POCT Influenza A/B testing negative 5/24/2019 ASSESSMENT/PLAN:  Leta Gustafson is a 64-year-old female who presents today for 2 days of worsening cough, general malaise/fatigue, sensation of feeling hot/feverish. Patient also with cough and more difficulty breathing, shortness of breath. Diffuse expiratory wheezing on exam.  Cannot rule out atypical pneumonia, will get CBC and chest x-ray. Possible viral pneumonia or acute viral bronchitis as well vs acute bronchospasm. Treat empirically with azithromycin and Medrol Dosepak, Tessalon Perles. Will treat patient with albuterol inhaler for the wheezing/bronchospasm. Influenza testing negative. Could likely be a viral etiology as well, and explained this to the patient. Patient with recent travel in the last 10 weeks to [place], and history of histoplasmosis herself in the past 5-7 years. I think this is less likely, but I think that we consider histoplasma testing if she fails to improve or worsens over the next 72-96 hours. Will review chest x-ray and CBC when made available. 1. Atypical pneumonia  2. Cough  3. Sensation of feeling hot  4. Malaise and fatigue  - azithromycin (ZITHROMAX) 250 MG tablet; Take 1 tablet by mouth See Admin Instructions for 5 days 500mg on day 1 followed by 250mg on days 2 - 5  Dispense: 6 tablet; Refill: 0  - methylPREDNISolone (MEDROL DOSEPACK) 4 MG tablet; Take by mouth per package instructions  Dispense: 21 tablet; Refill: 0  - benzonatate (TESSALON) 100 MG capsule; Take 1 capsule by mouth 3 times daily as needed for Cough  Dispense: 30 capsule; Refill: 0  - CBC Auto Differential  - XR CHEST STANDARD (2 VW); Future    5. Nausea and vomiting, intractability of vomiting not specified, unspecified vomiting type  -possibly post tussis emesis; monitor for now, can send anti-emetic if patient continues to vomit or feel  Nauseous, could consider phenergan cough syrup    6. Bronchospasm with bronchitis, acute  7. Wheezing  - XR CHEST STANDARD (2 VW);  Future  - methylPREDNISolone (MEDROL DOSEPACK) 4 MG tablet; Take by mouth per package instructions  Dispense: 21 tablet; Refill: 0  - albuterol sulfate  (90 Base) MCG/ACT inhaler; Inhale 2 puffs into the lungs 4 times daily as needed for Wheezing  Dispense: 1 Inhaler; Refill: 0    Reviewed treatment plan with patient. Patient verbalized understanding to treatment plan and questions were answered. Return in about 5 days (around 5/29/2019), or if symptoms worsen or fail to improve. Anabel Brown.  Darshana Shah.      5/24/2019

## 2019-05-26 ENCOUNTER — TELEPHONE (OUTPATIENT)
Dept: FAMILY MEDICINE CLINIC | Age: 54
End: 2019-05-26

## 2019-05-26 DIAGNOSIS — R05.9 COUGHING: ICD-10-CM

## 2019-05-26 DIAGNOSIS — J18.9 PNEUMONIA, PRIMARY ATYPICAL: ICD-10-CM

## 2019-05-26 DIAGNOSIS — R06.2 WHEEZING: Primary | ICD-10-CM

## 2019-05-26 DIAGNOSIS — R53.81 MALAISE AND FATIGUE: ICD-10-CM

## 2019-05-26 DIAGNOSIS — R53.83 MALAISE AND FATIGUE: ICD-10-CM

## 2019-05-26 RX ORDER — PREDNISONE 20 MG/1
40 TABLET ORAL DAILY
Qty: 10 TABLET | Refills: 0 | Status: SHIPPED | OUTPATIENT
Start: 2019-05-26 | End: 2019-05-31

## 2019-05-26 RX ORDER — LEVOFLOXACIN 750 MG/1
750 TABLET ORAL DAILY
Qty: 5 TABLET | Refills: 0 | Status: SHIPPED | OUTPATIENT
Start: 2019-05-26 | End: 2019-05-31

## 2019-05-28 ENCOUNTER — HOSPITAL ENCOUNTER (OUTPATIENT)
Dept: PHYSICAL THERAPY | Age: 54
Setting detail: THERAPIES SERIES
End: 2019-05-28
Payer: COMMERCIAL

## 2020-08-10 ENCOUNTER — OFFICE VISIT (OUTPATIENT)
Dept: GYNECOLOGY | Age: 55
End: 2020-08-10
Payer: COMMERCIAL

## 2020-08-10 VITALS
TEMPERATURE: 98.4 F | RESPIRATION RATE: 17 BRPM | SYSTOLIC BLOOD PRESSURE: 137 MMHG | HEART RATE: 77 BPM | HEIGHT: 66 IN | DIASTOLIC BLOOD PRESSURE: 68 MMHG | BODY MASS INDEX: 28.31 KG/M2 | WEIGHT: 176.13 LBS

## 2020-08-10 PROCEDURE — 99396 PREV VISIT EST AGE 40-64: CPT | Performed by: OBSTETRICS & GYNECOLOGY

## 2020-08-10 RX ORDER — ESTRADIOL 0.05 MG/D
1 FILM, EXTENDED RELEASE TRANSDERMAL
COMMUNITY

## 2020-08-12 LAB
HPV COMMENT: ABNORMAL
HPV TYPE 16: NOT DETECTED
HPV TYPE 18: NOT DETECTED
HPVOH (OTHER TYPES): DETECTED

## 2020-08-15 ASSESSMENT — ENCOUNTER SYMPTOMS
GASTROINTESTINAL NEGATIVE: 1
RESPIRATORY NEGATIVE: 1
EYES NEGATIVE: 1

## 2020-08-16 NOTE — PROGRESS NOTES
Subjective:      Patient ID: Saurav Woodson is a 60 y.o. female. Patient is here for annual. Patient in menopause. Gynecologic Exam         Review of Systems   Constitutional: Negative. HENT: Negative. Eyes: Negative. Respiratory: Negative. Cardiovascular: Negative. Gastrointestinal: Negative. Genitourinary: Negative. Musculoskeletal: Negative. Skin: Negative. Neurological: Negative. Psychiatric/Behavioral: Negative.       Date of Birth 1965  Past Medical History:   Diagnosis Date    Chronic neck pain     Closed fracture of right foot 2018    PONV (postoperative nausea and vomiting)     only once out of many surgeries    Urinary incontinence      Past Surgical History:   Procedure Laterality Date    BLADDER SURGERY      interstim    CATARACT REMOVAL Bilateral     CHOLECYSTECTOMY      FOOT SURGERY Right 2018    rt 4th digit arthroplasty of rt foot    INCONTINENCE SURGERY  2018    Interstim stage ll with c-arm    JOINT REPLACEMENT Left     knee    LUNG SURGERY Left     lower lobe    OTHER SURGICAL HISTORY  2018    stage 1 interstim implant, flouroscopy    SKIN CANCER EXCISION      melanoma on back    SPINE SURGERY       OB History    Para Term  AB Living   5 4 4   1     SAB TAB Ectopic Molar Multiple Live Births   1                # Outcome Date GA Lbr Wei/2nd Weight Sex Delivery Anes PTL Lv   5 Term 96    F Vag-Spont      4 Term 91    M Vag-Spont      3 Term 87    F Vag-Spont      2 Term 85    F Vag-Spont      1 SAB              Social History     Socioeconomic History    Marital status:      Spouse name: Not on file    Number of children: Not on file    Years of education: Not on file    Highest education level: Not on file   Occupational History    Not on file   Social Needs    Financial resource strain: Not on file    Food insecurity     Worry: Not on file     Inability: Not on file   Greta Hunter Transportation needs     Medical: Not on file     Non-medical: Not on file   Tobacco Use    Smoking status: Never Smoker    Smokeless tobacco: Never Used   Substance and Sexual Activity    Alcohol use: Yes     Alcohol/week: 1.0 - 2.0 standard drinks     Types: 1 - 2 Glasses of wine per week    Drug use: No    Sexual activity: Yes     Birth control/protection: Post-menopausal   Lifestyle    Physical activity     Days per week: Not on file     Minutes per session: Not on file    Stress: Not on file   Relationships    Social connections     Talks on phone: Not on file     Gets together: Not on file     Attends [employer] service: Not on file     Active member of club or organization: Not on file     Attends meetings of clubs or organizations: Not on file     Relationship status: Not on file    Intimate partner violence     Fear of current or ex partner: Not on file     Emotionally abused: Not on file     Physically abused: Not on file     Forced sexual activity: Not on file   Other Topics Concern    Not on file   Social History Narrative    Not on file     Allergies   Allergen Reactions    Codeine Anxiety, Rash and Anaphylaxis     Pt.  Sees things crawling down walls, BP bottoms out     Outpatient Medications Marked as Taking for the 8/10/20 encounter (Office Visit) with Ahsan Vidal MD   Medication Sig Dispense Refill    estradiol (Jagdeep Stank) 0.05 MG/24HR Place 1 patch onto the skin Twice a Week      PROGESTERONE MICRONIZED PO Take by mouth       Family History   Problem Relation Age of Onset    Cancer Father         kidney    Stroke Maternal Grandmother     Heart Attack Maternal Grandfather     Alcohol Abuse Paternal Grandfather      /68 (Site: Left Upper Arm, Position: Sitting, Cuff Size: Medium Adult)   Pulse 77   Temp 98.4 °F (36.9 °C) (Oral)   Resp 17   Ht 5' 6\" (1.676 m)   Wt 176 lb 2 oz (79.9 kg)   LMP  (LMP Unknown)   Breastfeeding No   BMI 28.43 kg/m²       Objective: Physical Exam  Constitutional:       General: She is not in acute distress. Appearance: Normal appearance. She is well-developed and normal weight. She is not diaphoretic. HENT:      Head: Normocephalic and atraumatic. Nose: Nose normal.      Mouth/Throat:      Mouth: Mucous membranes are moist.      Pharynx: Oropharynx is clear. Eyes:      Pupils: Pupils are equal, round, and reactive to light. Neck:      Musculoskeletal: Normal range of motion and neck supple. No neck rigidity. Thyroid: No thyromegaly. Cardiovascular:      Rate and Rhythm: Normal rate and regular rhythm. Heart sounds: Normal heart sounds. No murmur. No friction rub. No gallop. Pulmonary:      Effort: Pulmonary effort is normal. No respiratory distress. Breath sounds: Normal breath sounds. No wheezing or rales. Chest:      Breasts:         Right: Normal. No swelling, bleeding, inverted nipple, mass, nipple discharge, skin change or tenderness. Left: Normal. No swelling, bleeding, inverted nipple, mass, nipple discharge, skin change or tenderness. Abdominal:      General: Abdomen is flat. Bowel sounds are normal. There is no distension. Palpations: Abdomen is soft. There is no hepatomegaly or mass. Tenderness: There is no abdominal tenderness. There is no guarding or rebound. Hernia: No hernia is present. There is no hernia in the left inguinal area. Genitourinary:     General: Normal vulva. Exam position: Lithotomy position. Pubic Area: No rash. Labia:         Right: No rash, tenderness, lesion or injury. Left: No rash, tenderness, lesion or injury. Urethra: No prolapse, urethral pain, urethral swelling or urethral lesion. Vagina: Normal. No signs of injury and foreign body. No vaginal discharge, erythema, tenderness or bleeding. Cervix: No cervical motion tenderness, discharge, friability, lesion, erythema, cervical bleeding or eversion.
